# Patient Record
Sex: FEMALE | Race: BLACK OR AFRICAN AMERICAN | NOT HISPANIC OR LATINO | URBAN - METROPOLITAN AREA
[De-identification: names, ages, dates, MRNs, and addresses within clinical notes are randomized per-mention and may not be internally consistent; named-entity substitution may affect disease eponyms.]

---

## 2024-11-18 ENCOUNTER — EMERGENCY (EMERGENCY)
Facility: HOSPITAL | Age: 29
LOS: 1 days | Discharge: HOME | End: 2024-11-18
Attending: EMERGENCY MEDICINE | Admitting: EMERGENCY MEDICINE
Payer: COMMERCIAL

## 2024-11-18 VITALS
TEMPERATURE: 99 F | HEART RATE: 78 BPM | SYSTOLIC BLOOD PRESSURE: 106 MMHG | OXYGEN SATURATION: 100 % | RESPIRATION RATE: 18 BRPM | DIASTOLIC BLOOD PRESSURE: 63 MMHG

## 2024-11-18 VITALS
TEMPERATURE: 98 F | SYSTOLIC BLOOD PRESSURE: 126 MMHG | HEART RATE: 81 BPM | WEIGHT: 175.05 LBS | OXYGEN SATURATION: 99 % | DIASTOLIC BLOOD PRESSURE: 84 MMHG | RESPIRATION RATE: 18 BRPM

## 2024-11-18 LAB
ALBUMIN SERPL ELPH-MCNC: 3.8 G/DL — SIGNIFICANT CHANGE UP (ref 3.3–5)
ALP SERPL-CCNC: 51 U/L — SIGNIFICANT CHANGE UP (ref 40–120)
ALT FLD-CCNC: 17 U/L — SIGNIFICANT CHANGE UP (ref 4–33)
ANION GAP SERPL CALC-SCNC: 16 MMOL/L — HIGH (ref 7–14)
AST SERPL-CCNC: 21 U/L — SIGNIFICANT CHANGE UP (ref 4–32)
BASOPHILS # BLD AUTO: 0.03 K/UL — SIGNIFICANT CHANGE UP (ref 0–0.2)
BASOPHILS NFR BLD AUTO: 0.3 % — SIGNIFICANT CHANGE UP (ref 0–2)
BILIRUB SERPL-MCNC: 0.6 MG/DL — SIGNIFICANT CHANGE UP (ref 0.2–1.2)
BUN SERPL-MCNC: 8 MG/DL — SIGNIFICANT CHANGE UP (ref 7–23)
CALCIUM SERPL-MCNC: 9.8 MG/DL — SIGNIFICANT CHANGE UP (ref 8.4–10.5)
CHLORIDE SERPL-SCNC: 99 MMOL/L — SIGNIFICANT CHANGE UP (ref 98–107)
CO2 SERPL-SCNC: 21 MMOL/L — LOW (ref 22–31)
CREAT SERPL-MCNC: 0.59 MG/DL — SIGNIFICANT CHANGE UP (ref 0.5–1.3)
EGFR: 125 ML/MIN/1.73M2 — SIGNIFICANT CHANGE UP
EOSINOPHIL # BLD AUTO: 0.13 K/UL — SIGNIFICANT CHANGE UP (ref 0–0.5)
EOSINOPHIL NFR BLD AUTO: 1.4 % — SIGNIFICANT CHANGE UP (ref 0–6)
GLUCOSE SERPL-MCNC: 82 MG/DL — SIGNIFICANT CHANGE UP (ref 70–99)
HCT VFR BLD CALC: 32.3 % — LOW (ref 34.5–45)
HGB BLD-MCNC: 10.5 G/DL — LOW (ref 11.5–15.5)
IANC: 5.47 K/UL — SIGNIFICANT CHANGE UP (ref 1.8–7.4)
IMM GRANULOCYTES NFR BLD AUTO: 0.3 % — SIGNIFICANT CHANGE UP (ref 0–0.9)
LYMPHOCYTES # BLD AUTO: 2.43 K/UL — SIGNIFICANT CHANGE UP (ref 1–3.3)
LYMPHOCYTES # BLD AUTO: 26.9 % — SIGNIFICANT CHANGE UP (ref 13–44)
MAGNESIUM SERPL-MCNC: 1.5 MG/DL — LOW (ref 1.6–2.6)
MCHC RBC-ENTMCNC: 29 PG — SIGNIFICANT CHANGE UP (ref 27–34)
MCHC RBC-ENTMCNC: 32.5 G/DL — SIGNIFICANT CHANGE UP (ref 32–36)
MCV RBC AUTO: 89.2 FL — SIGNIFICANT CHANGE UP (ref 80–100)
MONOCYTES # BLD AUTO: 0.94 K/UL — HIGH (ref 0–0.9)
MONOCYTES NFR BLD AUTO: 10.4 % — SIGNIFICANT CHANGE UP (ref 2–14)
NEUTROPHILS # BLD AUTO: 5.47 K/UL — SIGNIFICANT CHANGE UP (ref 1.8–7.4)
NEUTROPHILS NFR BLD AUTO: 60.7 % — SIGNIFICANT CHANGE UP (ref 43–77)
NRBC # BLD: 0 /100 WBCS — SIGNIFICANT CHANGE UP (ref 0–0)
NRBC # FLD: 0 K/UL — SIGNIFICANT CHANGE UP (ref 0–0)
PLATELET # BLD AUTO: 212 K/UL — SIGNIFICANT CHANGE UP (ref 150–400)
POTASSIUM SERPL-MCNC: 3.5 MMOL/L — SIGNIFICANT CHANGE UP (ref 3.5–5.3)
POTASSIUM SERPL-SCNC: 3.5 MMOL/L — SIGNIFICANT CHANGE UP (ref 3.5–5.3)
PROT SERPL-MCNC: 6.8 G/DL — SIGNIFICANT CHANGE UP (ref 6–8.3)
RBC # BLD: 3.62 M/UL — LOW (ref 3.8–5.2)
RBC # FLD: 14.3 % — SIGNIFICANT CHANGE UP (ref 10.3–14.5)
SODIUM SERPL-SCNC: 136 MMOL/L — SIGNIFICANT CHANGE UP (ref 135–145)
WBC # BLD: 9.03 K/UL — SIGNIFICANT CHANGE UP (ref 3.8–10.5)
WBC # FLD AUTO: 9.03 K/UL — SIGNIFICANT CHANGE UP (ref 3.8–10.5)

## 2024-11-18 PROCEDURE — 99284 EMERGENCY DEPT VISIT MOD MDM: CPT | Mod: 25

## 2024-11-18 RX ORDER — SODIUM CHLORIDE 9 MG/ML
1000 INJECTION, SOLUTION INTRAMUSCULAR; INTRAVENOUS; SUBCUTANEOUS ONCE
Refills: 0 | Status: COMPLETED | OUTPATIENT
Start: 2024-11-18 | End: 2024-11-18

## 2024-11-18 RX ORDER — ONDANSETRON HYDROCHLORIDE 2 MG/ML
4 INJECTION, SOLUTION INTRAMUSCULAR; INTRAVENOUS ONCE
Refills: 0 | Status: COMPLETED | OUTPATIENT
Start: 2024-11-18 | End: 2024-11-18

## 2024-11-18 RX ADMIN — ONDANSETRON HYDROCHLORIDE 4 MILLIGRAM(S): 2 INJECTION, SOLUTION INTRAMUSCULAR; INTRAVENOUS at 04:23

## 2024-11-18 RX ADMIN — SODIUM CHLORIDE 1000 MILLILITER(S): 9 INJECTION, SOLUTION INTRAMUSCULAR; INTRAVENOUS; SUBCUTANEOUS at 04:23

## 2024-11-18 NOTE — ED ADULT TRIAGE NOTE - CHIEF COMPLAINT QUOTE
C/o hematemesis x 1 day. pt is ~14 weeks pregnant, . endorsing N/V with "blood clots in vomit." and lower abd pain. denies blood thinner use, dizziness. Hx asthma  as per L&D NP Prachi, pt to be evaluated in ED

## 2024-11-18 NOTE — ED PROVIDER NOTE - NSFOLLOWUPINSTRUCTIONS_ED_ALL_ED_FT
Your lab and imaging results are attached.    Please take the zofran as prescribed by your doctor. Please also follow up with your primary doctor.    Return to the ED for new or worsening symptoms.

## 2024-11-18 NOTE — ED ADULT NURSE NOTE - NSFALLUNIVINTERV_ED_ALL_ED
Bed/Stretcher in lowest position, wheels locked, appropriate side rails in place/Call bell, personal items and telephone in reach/Instruct patient to call for assistance before getting out of bed/chair/stretcher/Non-slip footwear applied when patient is off stretcher/[place] to call system/Physically safe environment - no spills, clutter or unnecessary equipment/Purposeful proactive rounding/Room/bathroom lighting operational, light cord in reach

## 2024-11-18 NOTE — ED ADULT NURSE REASSESSMENT NOTE - NS ED NURSE REASSESS COMMENT FT1
Patient A&Ox4, breathing with ease, no signs of acute distress, no complaints at this time, will continue to monitor and assess.

## 2024-11-18 NOTE — ED ADULT NURSE NOTE - OBJECTIVE STATEMENT
Patient states that she has been vomiting blood since yesterday morning and has  lower abdominal pain

## 2024-11-18 NOTE — ED PROVIDER NOTE - PATIENT PORTAL LINK FT
You can access the FollowMyHealth Patient Portal offered by [facility] by registering at the following website: [email or web address]. By joining [email or web address] portal, you will also be able to view your health information using other applications (apps) compatible with our system.

## 2024-11-18 NOTE — ED PROVIDER NOTE - CLINICAL SUMMARY MEDICAL DECISION MAKING FREE TEXT BOX
29-year-old female 14 weeks pregnant with confirmed IUP, presents due to spots of blood with blood clots noted in emesis today.  Patient is in multiple episodes of emesis that she says she has had since the beginning of her pregnancy.  Patient is not as concerned about the emesis and more concerned that this episode today had blood.  Denies fevers, lightheadedness, chest pain, shortness of breath, dumping, nausea, vomiting, vaginal bleeding or discharge.  Patient just prescribed Zofran by her obstetrics doctor but has not taken yet.  Afebrile, nontachycardic, not hypoxic, lungs clear, abdomen nontender.  .  Likely Shaniqua-Chung tear in setting of multiple episodes of emesis.  Will check for electrolyte derangement, fluids, pain control, reassess. 29-year-old female 14 weeks pregnant with confirmed IUP, presents due to streaks of blood with clots noted in emesis today.  Patient has had multiple episodes of emesis since the beginning of her pregnancy, but this is the first time she noticed blood.  Denies fevers, lightheadedness, chest pain, shortness of breath, abd pain, vaginal bleeding or discharge.  Patient recently prescribed Zofran by her OB doctor but has not started the medication yet. Afebrile, nontachycardic, not hypoxic, lungs clear, abdomen nontender.  .  Likely Shaniqua-Chung tear in setting of multiple episodes of emesis.  Will check for electrolyte derangement, fluids, pain control, reassess.

## 2024-11-18 NOTE — ED PROVIDER NOTE - ATTENDING CONTRIBUTION TO CARE
29F complaining of N/V and today had small amount of blood streaks with clots when vomiting.  Patient has had on and off vomiting during first trimester.  No previous ED visits.  Was prescribed medication 1 worsened at end of first trimester, Zofran.  Patient had not yet picked up Zofran but had additional vomiting today and saw blood so came to ED.  Denies abdominal pain, no vaginal bleeding or discharge, has OB follow-up and had uneventful last visit.  No urinary complaints.  Denies fever/chills.  No history of anemia, no lightheadedness/dizziness.  Denies CP/SOB.  MMM, clear lungs, heart reg, abd soft, NT to palp, no junito/guarding, no CVAT, no edema, NT calves.

## 2024-11-18 NOTE — ED ADULT NURSE NOTE - NS ED NURSE DISCH DISPOSITION
Detail Level: Detailed Render Post-Care Instructions In Note?: no Number Of Freeze-Thaw Cycles: 1 freeze-thaw cycle Duration Of Freeze Thaw-Cycle (Seconds): 3 Post-Care Instructions: I reviewed with the patient in detail post-care instructions. Patient is to wear sunprotection, and avoid picking at any of the treated lesions. Pt may apply Vaseline to crusted or scabbing areas. Show Aperture Variable?: Yes Consent: The patient's consent was obtained including but not limited to risks of crusting, scabbing, blistering, scarring, darker or lighter pigmentary change, recurrence, incomplete removal and infection. Discharged

## 2024-11-18 NOTE — ED PROVIDER NOTE - PHYSICAL EXAMINATION
GEN: NAD. A&Ox3. Non-toxic appearing.  HEENT: normocephalic, atraumatic, EOMI, PERRL, no scleral icterus, no conjuntival pallor, moist MM  CARDIAC: RRR, S1, S2, no murmur. Radial pulses present and symmetric b/l  PULM: CTA B/L no wheeze, rhonchi, rales.   ABD: soft NT, ND, no rebound no guarding  MSK: Moving all extremities, no edema  NEURO: no focal neurological deficits, CN 2-12 grossly intact  SKIN: warm, dry, no rash.

## 2025-03-15 ENCOUNTER — EMERGENCY (EMERGENCY)
Facility: HOSPITAL | Age: 30
LOS: 1 days | Discharge: STILL ADMITTED OR ADMINISTRATIVE | End: 2025-03-15
Admitting: EMERGENCY MEDICINE
Payer: COMMERCIAL

## 2025-03-15 ENCOUNTER — OUTPATIENT (OUTPATIENT)
Dept: INPATIENT UNIT | Facility: HOSPITAL | Age: 30
LOS: 1 days | Discharge: HOME | End: 2025-03-15
Payer: MEDICAID

## 2025-03-15 ENCOUNTER — ASOB RESULT (OUTPATIENT)
Age: 30
End: 2025-03-15

## 2025-03-15 ENCOUNTER — APPOINTMENT (OUTPATIENT)
Dept: ANTEPARTUM | Facility: CLINIC | Age: 30
End: 2025-03-15

## 2025-03-15 VITALS
TEMPERATURE: 98 F | RESPIRATION RATE: 16 BRPM | SYSTOLIC BLOOD PRESSURE: 128 MMHG | HEART RATE: 93 BPM | DIASTOLIC BLOOD PRESSURE: 82 MMHG

## 2025-03-15 VITALS
DIASTOLIC BLOOD PRESSURE: 86 MMHG | SYSTOLIC BLOOD PRESSURE: 126 MMHG | TEMPERATURE: 98 F | HEART RATE: 94 BPM | OXYGEN SATURATION: 99 % | RESPIRATION RATE: 18 BRPM

## 2025-03-15 VITALS — HEART RATE: 95 BPM | DIASTOLIC BLOOD PRESSURE: 78 MMHG | SYSTOLIC BLOOD PRESSURE: 126 MMHG

## 2025-03-15 DIAGNOSIS — O26.899 OTHER SPECIFIED PREGNANCY RELATED CONDITIONS, UNSPECIFIED TRIMESTER: ICD-10-CM

## 2025-03-15 DIAGNOSIS — Z87.81 PERSONAL HISTORY OF (HEALED) TRAUMATIC FRACTURE: Chronic | ICD-10-CM

## 2025-03-15 LAB
ALBUMIN SERPL ELPH-MCNC: 3.6 G/DL — SIGNIFICANT CHANGE UP (ref 3.3–5)
ALP SERPL-CCNC: 133 U/L — HIGH (ref 40–120)
ALT FLD-CCNC: 16 U/L — SIGNIFICANT CHANGE UP (ref 4–33)
ANION GAP SERPL CALC-SCNC: 12 MMOL/L — SIGNIFICANT CHANGE UP (ref 7–14)
APPEARANCE UR: ABNORMAL
AST SERPL-CCNC: 20 U/L — SIGNIFICANT CHANGE UP (ref 4–32)
BACTERIA # UR AUTO: NEGATIVE /HPF — SIGNIFICANT CHANGE UP
BILIRUB SERPL-MCNC: 0.6 MG/DL — SIGNIFICANT CHANGE UP (ref 0.2–1.2)
BILIRUB UR-MCNC: ABNORMAL
BUN SERPL-MCNC: 6 MG/DL — LOW (ref 7–23)
CALCIUM SERPL-MCNC: 9.3 MG/DL — SIGNIFICANT CHANGE UP (ref 8.4–10.5)
CAST: 3 /LPF — SIGNIFICANT CHANGE UP (ref 0–4)
CHLORIDE SERPL-SCNC: 101 MMOL/L — SIGNIFICANT CHANGE UP (ref 98–107)
CO2 SERPL-SCNC: 20 MMOL/L — LOW (ref 22–31)
COLOR SPEC: SIGNIFICANT CHANGE UP
CREAT ?TM UR-MCNC: 224 MG/DL — SIGNIFICANT CHANGE UP
CREAT SERPL-MCNC: 0.59 MG/DL — SIGNIFICANT CHANGE UP (ref 0.5–1.3)
DIFF PNL FLD: NEGATIVE — SIGNIFICANT CHANGE UP
EGFR: 125 ML/MIN/1.73M2 — SIGNIFICANT CHANGE UP
EGFR: 125 ML/MIN/1.73M2 — SIGNIFICANT CHANGE UP
GLUCOSE SERPL-MCNC: 66 MG/DL — LOW (ref 70–99)
GLUCOSE UR QL: NEGATIVE MG/DL — SIGNIFICANT CHANGE UP
HCT VFR BLD CALC: 31.1 % — LOW (ref 34.5–45)
HGB BLD-MCNC: 10 G/DL — LOW (ref 11.5–15.5)
KETONES UR-MCNC: NEGATIVE MG/DL — SIGNIFICANT CHANGE UP
LDH SERPL L TO P-CCNC: 242 U/L — HIGH (ref 135–225)
LEUKOCYTE ESTERASE UR-ACNC: ABNORMAL
MCHC RBC-ENTMCNC: 29.4 PG — SIGNIFICANT CHANGE UP (ref 27–34)
MCHC RBC-ENTMCNC: 32.2 G/DL — SIGNIFICANT CHANGE UP (ref 32–36)
MCV RBC AUTO: 91.5 FL — SIGNIFICANT CHANGE UP (ref 80–100)
NITRITE UR-MCNC: NEGATIVE — SIGNIFICANT CHANGE UP
NRBC # BLD AUTO: 0.06 K/UL — HIGH (ref 0–0)
NRBC # FLD: 0.06 K/UL — HIGH (ref 0–0)
NRBC BLD AUTO-RTO: 0 /100 WBCS — SIGNIFICANT CHANGE UP (ref 0–0)
PH UR: 7 — SIGNIFICANT CHANGE UP (ref 5–8)
PLATELET # BLD AUTO: 256 K/UL — SIGNIFICANT CHANGE UP (ref 150–400)
POTASSIUM SERPL-MCNC: 3.6 MMOL/L — SIGNIFICANT CHANGE UP (ref 3.5–5.3)
POTASSIUM SERPL-SCNC: 3.6 MMOL/L — SIGNIFICANT CHANGE UP (ref 3.5–5.3)
PROT ?TM UR-MCNC: 32 MG/DL — SIGNIFICANT CHANGE UP
PROT SERPL-MCNC: 7.3 G/DL — SIGNIFICANT CHANGE UP (ref 6–8.3)
PROT UR-MCNC: 30 MG/DL
PROT/CREAT UR-RTO: 0.1 RATIO — SIGNIFICANT CHANGE UP (ref 0–0.2)
RBC # BLD: 3.4 M/UL — LOW (ref 3.8–5.2)
RBC # FLD: 15.3 % — HIGH (ref 10.3–14.5)
RBC CASTS # UR COMP ASSIST: 4 /HPF — SIGNIFICANT CHANGE UP (ref 0–4)
REVIEW: SIGNIFICANT CHANGE UP
SODIUM SERPL-SCNC: 133 MMOL/L — LOW (ref 135–145)
SP GR SPEC: 1.02 — SIGNIFICANT CHANGE UP (ref 1–1.03)
SQUAMOUS # UR AUTO: 5 /HPF — SIGNIFICANT CHANGE UP (ref 0–5)
URATE SERPL-MCNC: 3.5 MG/DL — SIGNIFICANT CHANGE UP (ref 2.5–7)
UROBILINOGEN FLD QL: 2 MG/DL (ref 0.2–1)
WBC # BLD: 10.12 K/UL — SIGNIFICANT CHANGE UP (ref 3.8–10.5)
WBC # FLD AUTO: 10.12 K/UL — SIGNIFICANT CHANGE UP (ref 3.8–10.5)
WBC UR QL: 3 /HPF — SIGNIFICANT CHANGE UP (ref 0–5)

## 2025-03-15 PROCEDURE — 76815 OB US LIMITED FETUS(S): CPT | Mod: 26

## 2025-03-15 PROCEDURE — L9996: CPT

## 2025-03-15 PROCEDURE — 59025 FETAL NON-STRESS TEST: CPT | Mod: 26

## 2025-03-15 PROCEDURE — 99221 1ST HOSP IP/OBS SF/LOW 40: CPT | Mod: 25

## 2025-03-15 PROCEDURE — 76819 FETAL BIOPHYS PROFIL W/O NST: CPT | Mod: 26

## 2025-03-15 PROCEDURE — 93010 ELECTROCARDIOGRAM REPORT: CPT

## 2025-03-15 PROCEDURE — 93010 ELECTROCARDIOGRAM REPORT: CPT | Mod: 76

## 2025-03-15 NOTE — ED ADULT TRIAGE NOTE - CHIEF COMPLAINT QUOTE
C/o possible syncope episode x 1 hours ago. Reports lowering herself to floor. 31 weeks pregnant. . SAL may 31st. denies chest pain, lightheadedness, head strike/ LOC .hx: preeclampsia

## 2025-03-17 DIAGNOSIS — D21.9 BENIGN NEOPLASM OF CONNECTIVE AND OTHER SOFT TISSUE, UNSPECIFIED: ICD-10-CM

## 2025-03-17 DIAGNOSIS — O99.013 ANEMIA COMPLICATING PREGNANCY, THIRD TRIMESTER: ICD-10-CM

## 2025-03-17 DIAGNOSIS — O99.891 OTHER SPECIFIED DISEASES AND CONDITIONS COMPLICATING PREGNANCY: ICD-10-CM

## 2025-03-17 DIAGNOSIS — O16.3 UNSPECIFIED MATERNAL HYPERTENSION, THIRD TRIMESTER: ICD-10-CM

## 2025-03-17 DIAGNOSIS — O26.893 OTHER SPECIFIED PREGNANCY RELATED CONDITIONS, THIRD TRIMESTER: ICD-10-CM

## 2025-03-17 DIAGNOSIS — Z3A.31 31 WEEKS GESTATION OF PREGNANCY: ICD-10-CM

## 2025-03-17 DIAGNOSIS — O09.13 SUPERVISION OF PREGNANCY WITH HISTORY OF ECTOPIC PREGNANCY, THIRD TRIMESTER: ICD-10-CM

## 2025-03-17 DIAGNOSIS — O14.93 UNSPECIFIED PRE-ECLAMPSIA, THIRD TRIMESTER: ICD-10-CM

## 2025-03-17 DIAGNOSIS — D64.9 ANEMIA, UNSPECIFIED: ICD-10-CM

## 2025-03-17 DIAGNOSIS — R55 SYNCOPE AND COLLAPSE: ICD-10-CM

## 2025-04-12 ENCOUNTER — APPOINTMENT (OUTPATIENT)
Dept: ANTEPARTUM | Facility: CLINIC | Age: 30
End: 2025-04-12

## 2025-04-12 ENCOUNTER — EMERGENCY (EMERGENCY)
Facility: HOSPITAL | Age: 30
LOS: 1 days | End: 2025-04-12
Admitting: EMERGENCY MEDICINE
Payer: COMMERCIAL

## 2025-04-12 ENCOUNTER — OUTPATIENT (OUTPATIENT)
Dept: OUTPATIENT SERVICES | Facility: HOSPITAL | Age: 30
LOS: 1 days | End: 2025-04-12
Payer: COMMERCIAL

## 2025-04-12 VITALS — HEART RATE: 93 BPM | SYSTOLIC BLOOD PRESSURE: 132 MMHG | DIASTOLIC BLOOD PRESSURE: 78 MMHG

## 2025-04-12 VITALS
SYSTOLIC BLOOD PRESSURE: 133 MMHG | TEMPERATURE: 99 F | DIASTOLIC BLOOD PRESSURE: 86 MMHG | RESPIRATION RATE: 16 BRPM | HEART RATE: 98 BPM

## 2025-04-12 VITALS
TEMPERATURE: 98 F | SYSTOLIC BLOOD PRESSURE: 138 MMHG | HEART RATE: 99 BPM | HEIGHT: 66 IN | WEIGHT: 229.94 LBS | DIASTOLIC BLOOD PRESSURE: 85 MMHG | OXYGEN SATURATION: 100 % | RESPIRATION RATE: 19 BRPM

## 2025-04-12 DIAGNOSIS — Z87.81 PERSONAL HISTORY OF (HEALED) TRAUMATIC FRACTURE: Chronic | ICD-10-CM

## 2025-04-12 DIAGNOSIS — O26.899 OTHER SPECIFIED PREGNANCY RELATED CONDITIONS, UNSPECIFIED TRIMESTER: ICD-10-CM

## 2025-04-12 PROBLEM — Z00.00 ENCOUNTER FOR PREVENTIVE HEALTH EXAMINATION: Status: ACTIVE | Noted: 2025-04-12

## 2025-04-12 PROCEDURE — L9996: CPT

## 2025-04-12 PROCEDURE — 59025 FETAL NON-STRESS TEST: CPT | Mod: 26

## 2025-04-12 PROCEDURE — 99221 1ST HOSP IP/OBS SF/LOW 40: CPT | Mod: 25

## 2025-04-12 RX ORDER — ASPIRIN 325 MG
1 TABLET ORAL
Refills: 0 | DISCHARGE

## 2025-04-12 NOTE — ED ADULT TRIAGE NOTE - CHIEF COMPLAINT QUOTE
Pt arrives to ED c/o contractions all day.  No water broken.  , Pt seen at [facility] for OB.  SAL 25  Hx: Fibroids.  L&D accepted pt, transporting by ED Tech.

## 2025-04-15 DIAGNOSIS — O09.293 SUPERVISION OF PREGNANCY WITH OTHER POOR REPRODUCTIVE OR OBSTETRIC HISTORY, THIRD TRIMESTER: ICD-10-CM

## 2025-04-15 DIAGNOSIS — O47.03 FALSE LABOR BEFORE 37 COMPLETED WEEKS OF GESTATION, THIRD TRIMESTER: ICD-10-CM

## 2025-04-15 DIAGNOSIS — D21.9 BENIGN NEOPLASM OF CONNECTIVE AND OTHER SOFT TISSUE, UNSPECIFIED: ICD-10-CM

## 2025-04-15 DIAGNOSIS — O99.513 DISEASES OF THE RESPIRATORY SYSTEM COMPLICATING PREGNANCY, THIRD TRIMESTER: ICD-10-CM

## 2025-04-15 DIAGNOSIS — J45.909 UNSPECIFIED ASTHMA, UNCOMPLICATED: ICD-10-CM

## 2025-04-15 DIAGNOSIS — Z3A.35 35 WEEKS GESTATION OF PREGNANCY: ICD-10-CM

## 2025-04-15 DIAGNOSIS — O99.891 OTHER SPECIFIED DISEASES AND CONDITIONS COMPLICATING PREGNANCY: ICD-10-CM

## 2025-04-15 LAB
CULTURE RESULTS: SIGNIFICANT CHANGE UP
SPECIMEN SOURCE: SIGNIFICANT CHANGE UP

## 2025-05-10 ENCOUNTER — APPOINTMENT (OUTPATIENT)
Dept: ANTEPARTUM | Facility: CLINIC | Age: 30
End: 2025-05-10

## 2025-05-10 ENCOUNTER — EMERGENCY (EMERGENCY)
Facility: HOSPITAL | Age: 30
LOS: 1 days | End: 2025-05-10
Admitting: EMERGENCY MEDICINE
Payer: COMMERCIAL

## 2025-05-10 ENCOUNTER — INPATIENT (INPATIENT)
Facility: HOSPITAL | Age: 30
LOS: 1 days | Discharge: HOME | End: 2025-05-12
Attending: SPECIALIST | Admitting: SPECIALIST
Payer: COMMERCIAL

## 2025-05-10 VITALS
OXYGEN SATURATION: 96 % | RESPIRATION RATE: 16 BRPM | SYSTOLIC BLOOD PRESSURE: 140 MMHG | DIASTOLIC BLOOD PRESSURE: 89 MMHG | HEIGHT: 66 IN | TEMPERATURE: 98 F | HEART RATE: 91 BPM

## 2025-05-10 VITALS
SYSTOLIC BLOOD PRESSURE: 135 MMHG | RESPIRATION RATE: 18 BRPM | DIASTOLIC BLOOD PRESSURE: 91 MMHG | TEMPERATURE: 98 F | HEART RATE: 80 BPM

## 2025-05-10 DIAGNOSIS — O26.899 OTHER SPECIFIED PREGNANCY RELATED CONDITIONS, UNSPECIFIED TRIMESTER: ICD-10-CM

## 2025-05-10 DIAGNOSIS — Z36.89 ENCOUNTER FOR OTHER SPECIFIED ANTENATAL SCREENING: ICD-10-CM

## 2025-05-10 PROBLEM — J45.909 UNSPECIFIED ASTHMA, UNCOMPLICATED: Chronic | Status: ACTIVE | Noted: 2025-04-12

## 2025-05-10 LAB
ALBUMIN SERPL ELPH-MCNC: 3.5 G/DL — SIGNIFICANT CHANGE UP (ref 3.3–5)
ALP SERPL-CCNC: 212 U/L — HIGH (ref 40–120)
ALT FLD-CCNC: 15 U/L — SIGNIFICANT CHANGE UP (ref 4–33)
AMPHET UR-MCNC: NEGATIVE — SIGNIFICANT CHANGE UP
ANION GAP SERPL CALC-SCNC: 18 MMOL/L — HIGH (ref 7–14)
APPEARANCE UR: CLEAR — SIGNIFICANT CHANGE UP
AST SERPL-CCNC: 31 U/L — SIGNIFICANT CHANGE UP (ref 4–32)
BACTERIA # UR AUTO: NEGATIVE /HPF — SIGNIFICANT CHANGE UP
BARBITURATES UR SCN-MCNC: NEGATIVE — SIGNIFICANT CHANGE UP
BASOPHILS # BLD AUTO: 0.02 K/UL — SIGNIFICANT CHANGE UP (ref 0–0.2)
BASOPHILS NFR BLD AUTO: 0.3 % — SIGNIFICANT CHANGE UP (ref 0–2)
BENZODIAZ UR-MCNC: NEGATIVE — SIGNIFICANT CHANGE UP
BILIRUB SERPL-MCNC: 0.7 MG/DL — SIGNIFICANT CHANGE UP (ref 0.2–1.2)
BILIRUB UR-MCNC: NEGATIVE — SIGNIFICANT CHANGE UP
BLD GP AB SCN SERPL QL: NEGATIVE — SIGNIFICANT CHANGE UP
BUN SERPL-MCNC: 14 MG/DL — SIGNIFICANT CHANGE UP (ref 7–23)
CALCIUM SERPL-MCNC: 10.1 MG/DL — SIGNIFICANT CHANGE UP (ref 8.4–10.5)
CAST: 1 /LPF — SIGNIFICANT CHANGE UP (ref 0–4)
CHLORIDE SERPL-SCNC: 100 MMOL/L — SIGNIFICANT CHANGE UP (ref 98–107)
CO2 SERPL-SCNC: 16 MMOL/L — LOW (ref 22–31)
COCAINE METAB.OTHER UR-MCNC: NEGATIVE — SIGNIFICANT CHANGE UP
COLOR SPEC: YELLOW — SIGNIFICANT CHANGE UP
CREAT ?TM UR-MCNC: 182 MG/DL — SIGNIFICANT CHANGE UP
CREAT SERPL-MCNC: 0.71 MG/DL — SIGNIFICANT CHANGE UP (ref 0.5–1.3)
CREATININE URINE RESULT, DAU: 182 MG/DL — SIGNIFICANT CHANGE UP
DIFF PNL FLD: ABNORMAL
EGFR: 118 ML/MIN/1.73M2 — SIGNIFICANT CHANGE UP
EGFR: 118 ML/MIN/1.73M2 — SIGNIFICANT CHANGE UP
EOSINOPHIL # BLD AUTO: 0.07 K/UL — SIGNIFICANT CHANGE UP (ref 0–0.5)
EOSINOPHIL NFR BLD AUTO: 0.9 % — SIGNIFICANT CHANGE UP (ref 0–6)
FENTANYL UR QL SCN: NEGATIVE — SIGNIFICANT CHANGE UP
GLUCOSE SERPL-MCNC: 76 MG/DL — SIGNIFICANT CHANGE UP (ref 70–99)
GLUCOSE UR QL: NEGATIVE MG/DL — SIGNIFICANT CHANGE UP
HCT VFR BLD CALC: 35.5 % — SIGNIFICANT CHANGE UP (ref 34.5–45)
HGB BLD-MCNC: 11.5 G/DL — SIGNIFICANT CHANGE UP (ref 11.5–15.5)
HIV 1+2 AB+HIV1 P24 AG SERPL QL IA: SIGNIFICANT CHANGE UP
IANC: 4.75 K/UL — SIGNIFICANT CHANGE UP (ref 1.8–7.4)
IMM GRANULOCYTES NFR BLD AUTO: 0.6 % — SIGNIFICANT CHANGE UP (ref 0–0.9)
KETONES UR-MCNC: 15 MG/DL
LDH SERPL L TO P-CCNC: 310 U/L — HIGH (ref 135–225)
LEUKOCYTE ESTERASE UR-ACNC: NEGATIVE — SIGNIFICANT CHANGE UP
LYMPHOCYTES # BLD AUTO: 2.44 K/UL — SIGNIFICANT CHANGE UP (ref 1–3.3)
LYMPHOCYTES # BLD AUTO: 30.5 % — SIGNIFICANT CHANGE UP (ref 13–44)
MCHC RBC-ENTMCNC: 29.9 PG — SIGNIFICANT CHANGE UP (ref 27–34)
MCHC RBC-ENTMCNC: 32.4 G/DL — SIGNIFICANT CHANGE UP (ref 32–36)
MCV RBC AUTO: 92.4 FL — SIGNIFICANT CHANGE UP (ref 80–100)
METHADONE UR-MCNC: NEGATIVE — SIGNIFICANT CHANGE UP
MONOCYTES # BLD AUTO: 0.66 K/UL — SIGNIFICANT CHANGE UP (ref 0–0.9)
MONOCYTES NFR BLD AUTO: 8.3 % — SIGNIFICANT CHANGE UP (ref 2–14)
NEUTROPHILS # BLD AUTO: 4.75 K/UL — SIGNIFICANT CHANGE UP (ref 1.8–7.4)
NEUTROPHILS NFR BLD AUTO: 59.4 % — SIGNIFICANT CHANGE UP (ref 43–77)
NITRITE UR-MCNC: NEGATIVE — SIGNIFICANT CHANGE UP
NRBC # BLD AUTO: 0.03 K/UL — HIGH (ref 0–0)
NRBC # FLD: 0.03 K/UL — HIGH (ref 0–0)
NRBC BLD AUTO-RTO: 0 /100 WBCS — SIGNIFICANT CHANGE UP (ref 0–0)
OPIATES UR-MCNC: NEGATIVE — SIGNIFICANT CHANGE UP
OXYCODONE UR-MCNC: NEGATIVE — SIGNIFICANT CHANGE UP
PCP SPEC-MCNC: SIGNIFICANT CHANGE UP
PCP UR-MCNC: NEGATIVE — SIGNIFICANT CHANGE UP
PH UR: 6.5 — SIGNIFICANT CHANGE UP (ref 5–8)
PLATELET # BLD AUTO: 189 K/UL — SIGNIFICANT CHANGE UP (ref 150–400)
POTASSIUM SERPL-MCNC: 4.1 MMOL/L — SIGNIFICANT CHANGE UP (ref 3.5–5.3)
POTASSIUM SERPL-SCNC: 4.1 MMOL/L — SIGNIFICANT CHANGE UP (ref 3.5–5.3)
PROT ?TM UR-MCNC: 48 MG/DL — SIGNIFICANT CHANGE UP
PROT SERPL-MCNC: 7.3 G/DL — SIGNIFICANT CHANGE UP (ref 6–8.3)
PROT UR-MCNC: 30 MG/DL
PROT/CREAT UR-RTO: 0.3 RATIO — HIGH (ref 0–0.2)
RBC # BLD: 3.84 M/UL — SIGNIFICANT CHANGE UP (ref 3.8–5.2)
RBC # FLD: 16.7 % — HIGH (ref 10.3–14.5)
RBC CASTS # UR COMP ASSIST: 36 /HPF — HIGH (ref 0–4)
REVIEW: SIGNIFICANT CHANGE UP
RH IG SCN BLD-IMP: POSITIVE — SIGNIFICANT CHANGE UP
RH IG SCN BLD-IMP: POSITIVE — SIGNIFICANT CHANGE UP
RUBV IGG SER-ACNC: 1.59 INDEX — SIGNIFICANT CHANGE UP
RUBV IGG SER-IMP: POSITIVE — SIGNIFICANT CHANGE UP
SODIUM SERPL-SCNC: 134 MMOL/L — LOW (ref 135–145)
SP GR SPEC: 1.03 — SIGNIFICANT CHANGE UP (ref 1–1.03)
SQUAMOUS # UR AUTO: 2 /HPF — SIGNIFICANT CHANGE UP (ref 0–5)
T PALLIDUM AB TITR SER: NEGATIVE — SIGNIFICANT CHANGE UP
THC UR QL: NEGATIVE — SIGNIFICANT CHANGE UP
URATE SERPL-MCNC: 5.5 MG/DL — SIGNIFICANT CHANGE UP (ref 2.5–7)
UROBILINOGEN FLD QL: 1 MG/DL — SIGNIFICANT CHANGE UP (ref 0.2–1)
WBC # BLD: 7.99 K/UL — SIGNIFICANT CHANGE UP (ref 3.8–10.5)
WBC # FLD AUTO: 7.99 K/UL — SIGNIFICANT CHANGE UP (ref 3.8–10.5)
WBC UR QL: 2 /HPF — SIGNIFICANT CHANGE UP (ref 0–5)

## 2025-05-10 PROCEDURE — 76815 OB US LIMITED FETUS(S): CPT | Mod: 26

## 2025-05-10 PROCEDURE — 59409 OBSTETRICAL CARE: CPT | Mod: U9

## 2025-05-10 PROCEDURE — L9996: CPT

## 2025-05-10 RX ORDER — SODIUM CHLORIDE 9 G/1000ML
1000 INJECTION, SOLUTION INTRAVENOUS
Refills: 0 | Status: DISCONTINUED | OUTPATIENT
Start: 2025-05-10 | End: 2025-05-10

## 2025-05-10 RX ORDER — PRAMOXINE HCL 1 %
1 GEL (GRAM) TOPICAL EVERY 4 HOURS
Refills: 0 | Status: DISCONTINUED | OUTPATIENT
Start: 2025-05-10 | End: 2025-05-12

## 2025-05-10 RX ORDER — MODIFIED LANOLIN 100 %
1 CREAM (GRAM) TOPICAL EVERY 6 HOURS
Refills: 0 | Status: DISCONTINUED | OUTPATIENT
Start: 2025-05-10 | End: 2025-05-12

## 2025-05-10 RX ORDER — CLOSTRIDIUM TETANI TOXOID ANTIGEN (FORMALDEHYDE INACTIVATED), CORYNEBACTERIUM DIPHTHERIAE TOXOID ANTIGEN (FORMALDEHYDE INACTIVATED), BORDETELLA PERTUSSIS TOXOID ANTIGEN (GLUTARALDEHYDE INACTIVATED), BORDETELLA PERTUSSIS FILAMENTOUS HEMAGGLUTININ ANTIGEN (FORMALDEHYDE INACTIVATED), BORDETELLA PERTUSSIS PERTACTIN ANTIGEN, AND BORDETELLA PERTUSSIS FIMBRIAE 2/3 ANTIGEN 5; 2; 2.5; 5; 3; 5 [LF]/.5ML; [LF]/.5ML; UG/.5ML; UG/.5ML; UG/.5ML; UG/.5ML
0.5 INJECTION, SUSPENSION INTRAMUSCULAR ONCE
Refills: 0 | Status: DISCONTINUED | OUTPATIENT
Start: 2025-05-10 | End: 2025-05-12

## 2025-05-10 RX ORDER — OXYTOCIN-SODIUM CHLORIDE 0.9% IV SOLN 30 UNIT/500ML 30-0.9/5 UT/ML-%
SOLUTION INTRAVENOUS
Qty: 30 | Refills: 0 | Status: DISCONTINUED | OUTPATIENT
Start: 2025-05-10 | End: 2025-05-10

## 2025-05-10 RX ORDER — WITCH HAZEL LEAF
1 FLUID EXTRACT MISCELLANEOUS EVERY 4 HOURS
Refills: 0 | Status: DISCONTINUED | OUTPATIENT
Start: 2025-05-10 | End: 2025-05-12

## 2025-05-10 RX ORDER — HYDROCORTISONE 10 MG/G
1 CREAM TOPICAL EVERY 6 HOURS
Refills: 0 | Status: DISCONTINUED | OUTPATIENT
Start: 2025-05-10 | End: 2025-05-12

## 2025-05-10 RX ORDER — DIPHENHYDRAMINE HCL 12.5MG/5ML
25 ELIXIR ORAL EVERY 6 HOURS
Refills: 0 | Status: DISCONTINUED | OUTPATIENT
Start: 2025-05-10 | End: 2025-05-12

## 2025-05-10 RX ORDER — ACETAMINOPHEN 500 MG/5ML
975 LIQUID (ML) ORAL
Refills: 0 | Status: DISCONTINUED | OUTPATIENT
Start: 2025-05-10 | End: 2025-05-12

## 2025-05-10 RX ORDER — OXYTOCIN-SODIUM CHLORIDE 0.9% IV SOLN 30 UNIT/500ML 30-0.9/5 UT/ML-%
167 SOLUTION INTRAVENOUS
Qty: 30 | Refills: 0 | Status: DISCONTINUED | OUTPATIENT
Start: 2025-05-10 | End: 2025-05-11

## 2025-05-10 RX ORDER — OXYCODONE HYDROCHLORIDE 30 MG/1
5 TABLET ORAL ONCE
Refills: 0 | Status: DISCONTINUED | OUTPATIENT
Start: 2025-05-10 | End: 2025-05-12

## 2025-05-10 RX ORDER — SIMETHICONE 80 MG
80 TABLET,CHEWABLE ORAL EVERY 4 HOURS
Refills: 0 | Status: DISCONTINUED | OUTPATIENT
Start: 2025-05-10 | End: 2025-05-12

## 2025-05-10 RX ORDER — SODIUM CHLORIDE 9 G/1000ML
500 INJECTION, SOLUTION INTRAVENOUS ONCE
Refills: 0 | Status: COMPLETED | OUTPATIENT
Start: 2025-05-10 | End: 2025-05-10

## 2025-05-10 RX ORDER — IBUPROFEN 200 MG
600 TABLET ORAL EVERY 6 HOURS
Refills: 0 | Status: COMPLETED | OUTPATIENT
Start: 2025-05-10 | End: 2026-04-08

## 2025-05-10 RX ORDER — OXYCODONE HYDROCHLORIDE 30 MG/1
5 TABLET ORAL
Refills: 0 | Status: DISCONTINUED | OUTPATIENT
Start: 2025-05-10 | End: 2025-05-12

## 2025-05-10 RX ORDER — KETOROLAC TROMETHAMINE 30 MG/ML
30 INJECTION, SOLUTION INTRAMUSCULAR; INTRAVENOUS ONCE
Refills: 0 | Status: DISCONTINUED | OUTPATIENT
Start: 2025-05-10 | End: 2025-05-11

## 2025-05-10 RX ORDER — BENZOCAINE 220 MG/G
1 SPRAY, METERED PERIODONTAL EVERY 6 HOURS
Refills: 0 | Status: DISCONTINUED | OUTPATIENT
Start: 2025-05-10 | End: 2025-05-12

## 2025-05-10 RX ORDER — PRENATAL 136/IRON/FOLIC ACID 27 MG-1 MG
1 TABLET ORAL DAILY
Refills: 0 | Status: DISCONTINUED | OUTPATIENT
Start: 2025-05-10 | End: 2025-05-12

## 2025-05-10 RX ORDER — DIBUCAINE 10 MG/G
1 OINTMENT TOPICAL EVERY 6 HOURS
Refills: 0 | Status: DISCONTINUED | OUTPATIENT
Start: 2025-05-10 | End: 2025-05-12

## 2025-05-10 RX ORDER — MAGNESIUM HYDROXIDE 400 MG/5ML
30 SUSPENSION ORAL
Refills: 0 | Status: DISCONTINUED | OUTPATIENT
Start: 2025-05-10 | End: 2025-05-12

## 2025-05-10 RX ADMIN — Medication 3 MILLILITER(S): at 12:29

## 2025-05-10 RX ADMIN — SODIUM CHLORIDE 125 MILLILITER(S): 9 INJECTION, SOLUTION INTRAVENOUS at 11:45

## 2025-05-10 RX ADMIN — OXYTOCIN-SODIUM CHLORIDE 0.9% IV SOLN 30 UNIT/500ML 2 MILLIUNIT(S)/MIN: 30-0.9/5 SOLUTION at 16:25

## 2025-05-10 RX ADMIN — SODIUM CHLORIDE 1000 MILLILITER(S): 9 INJECTION, SOLUTION INTRAVENOUS at 12:44

## 2025-05-10 RX ADMIN — Medication 1 APPLICATION(S): at 11:15

## 2025-05-10 RX ADMIN — SODIUM CHLORIDE 1000 MILLILITER(S): 9 INJECTION, SOLUTION INTRAVENOUS at 16:30

## 2025-05-10 RX ADMIN — SODIUM CHLORIDE 1000 MILLILITER(S): 9 INJECTION, SOLUTION INTRAVENOUS at 18:01

## 2025-05-10 NOTE — OB RN DELIVERY SUMMARY - NS_SEPSISRSKCALC_OBGYN_ALL_OB_FT
GBS status in the 'Prenatal Lab tests/results section' on the OB RN Patient Profile must be documented.   EOS calculated successfully. EOS Risk Factor: 0.5/1000 live births ([facility] national incidence); GA=39w1d; Temp=98.4; ROM=13.583; GBS='Negative'; Antibiotics='No antibiotics or any antibiotics < 2 hrs prior to birth'

## 2025-05-10 NOTE — CHART NOTE - NSCHARTNOTEFT_GEN_A_CORE
PTA Note    PTA called for cat 2 FHT with minimal to moderate variability, intermittent late decelerations.   Patient presented in early labor and received augmentation with Pitocin. Currently on 4 milliunits of   pitocin and has been making adequate cervical change. Fetal heart rate responds to scalp stimulation and repositioning.   Plan to continue IV hydration, reposition to allow for fetal head descent and continue Pitocin augmentation.   Present for PTA, primary nurse, nurse in charge, Dr Abreu and Dr Valladares.   Adore Valladares MD

## 2025-05-10 NOTE — OB PROVIDER LABOR PROGRESS NOTE - NS_OBIHIFHRDETAILS_OBGYN_ALL_OB_FT
baseline 135 bpm, minimal to moderate variability, +accels, +intermittent late decels
135/mod/+accels, intermittent late decelerations
Baseline 140 Minimal variability with periods of moderate variability.   + Accels with scalp stimulation,  - Decels
130, moderate, +accels, no decels

## 2025-05-10 NOTE — OB PROVIDER H&P - NS_OBGYNHISTORY_OBGYN_ALL_OB_FT
OB HX:   SAB x's 1 complete / methotrexate   GYN HX:  fibroids x's 1  , states 1 myoma ~ 10.37 x 9.58x 6.5 right lateral subserosal

## 2025-05-10 NOTE — OB PROVIDER H&P - NSHPPHYSICALEXAM_GEN_ALL_CORE
GENERAL: pt in  NAD,   HEAD:  Atraumatic, normocephalic  EYES :PERRLA, conjunctiva and sclera clear  NECK: Supple, nontender   HEART: Regular rate and rhythm,   LUNGS: Unlabored respirations.  Clear to auscultation bilaterally   ABDOMEN: Soft, nontender, gravid     chaperone by : Keyanna Otto RN   VE 3.5/80/-2   + pooling , mod amount thin meconium fluid    scan  ABD   OB limited sono   TAS cephalic FH: 143 bpm MVP: 3.01   images saved to [facility]    EXTREMITIES: 2+ peripheral pulses bilaterally. No clubbing, cyanosis, or edema  NERVOUS SYSTEM:  A&Ox3, moving all extremities, no focal deficits   SKIN: No rashes or lesions  NST  Baseline  (       130   ) BPM  Variability (  x)  Moderate   (  ) Minimal  (  ) Absent  (  )  Marked  Accelerations (  x) 15x15   (  ) 10x10  (  ) no  Decelerations ( x ) no  (  ) Variable  (  ) Early  (  ) Late      Description _________  Contractions (  ) no  ( x ) yes     Description  ______every 2 minutes ____  Interpretation ( x ) reactive   (  )  non-reactive   vitals

## 2025-05-10 NOTE — OB RN DELIVERY SUMMARY - NSSELHIDDEN_OBGYN_ALL_OB_FT
[NS_DeliveryAttending1_OBGYN_ALL_OB_FT:[id number]],[NS_DeliveryAssist1_OBGYN_ALL_OB_FT:[id number]],[NS_DeliveryAssist2_OBGYN_ALL_OB_FT:[id number]=],[NS_DeliveryRN_OBGYN_ALL_OB_FT:[id number]]

## 2025-05-10 NOTE — OB PROVIDER DELIVERY SUMMARY - NSPROVIDERDELIVERYNOTE_OBGYN_ALL_OB_FT
Spontaneous vaginal delivery of liveborn male infant from RICH position. Head, shoulders, and body delivered easily. Infant passed to mother. Cord was clamped and cut and handed to awaiting pediatricians. Placenta delivered spontaneously, noted to be intact. Fundal massage was given and uterine fundus was found to be firm. Vaginal exam revealed intact cervix, vaginal walls. Bilateral sulcal tears noted and repaired with 2-0 vicryl suture. Perineum with second degree laceration, repaired in standard fashion with chromic suture. Excellent hemostasis was noted. Patient stable. Count correct x 2.    Dr. Copeland and BUDDY Rader, PGY4 present for delivery.     Quiana Calvo, PGY1

## 2025-05-10 NOTE — OB PROVIDER H&P - ASSESSMENT
30 y/o  @ 39.1 wks gestation with SROM @ 0745 light meconium and uterine contractions :  plan of care d/w dr Valladares / dr Rosado   admit to l&d  SROM & labor @ 39.1 wks gestation for epidural prn / PEC labs/ urine toxicology / expectant management   see admission orders      Risks, benefits, alternatives, and possible complications have been discussed in detail with the patient in her native language. Pre-admission, admission, and post admission procedures and expectations were discussed in detail. All questions answered, all appropriate hospital consents were signed. Anticipate normal vaginal delivery.   Informed consent was obtained. The following was discussed:   - Induction/augmentation of labor: use of medication and/or cook balloon to begin or enhance labor   - Obstetrical management including internal fetal/contraction monitoring   - Normal vaginal delivery   - Possible  section

## 2025-05-10 NOTE — OB PROVIDER LABOR PROGRESS NOTE - NS_SUBJECTIVE/OBJECTIVE_OBGYN_ALL_OB_FT
Pt seen for eval of the labor curve   Pt with strong epidural. Not feeling anything.
S:  Pt seen and examined for cervical change.    O:  Vital Signs Last 24 Hrs  T(C): 36.8 (10 May 2025 12:02), Max: 36.9 (10 May 2025 08:30)  T(F): 98.24 (10 May 2025 12:02), Max: 98.4 (10 May 2025 08:30)  HR: 94 (10 May 2025 12:29) (77 - 105)  BP: 125/67 (10 May 2025 11:26) (107/58 - 157/91)  BP(mean): --  RR: 18 (10 May 2025 12:02) (16 - 18)  SpO2: 100% (10 May 2025 12:20) (91% - 100%)
VE to evaluate progress in labor
R1 OB Labor Note    S: Patient seen and examined at bedside. Feeling more pain and intermittent rectal pressure    T(C): 36.8 (05-10-25 @ 16:26), Max: 36.9 (05-10-25 @ 08:30)  HR: 98 (05-10-25 @ 17:50) (74 - 108)  BP: 133/91 (05-10-25 @ 17:30) (105/53 - 157/91)  RR: 18 (05-10-25 @ 16:26) (16 - 18)  SpO2: 89% (05-10-25 @ 17:50) (86% - 100%)

## 2025-05-10 NOTE — OB PROVIDER H&P - HISTORY OF PRESENT ILLNESS
PNC with [facility]  28 y/o  @ 39.1 wks gestation presents with c/o SROM @ 0745 moderate amount light meconium fluid noted with painful uterine contractions every 5 minutes since 0745 states her pain is 10/10 on pain scale denies any VB reports +FM denies any headache visual disturbances or right upper epigastric pain denies any n/v/d denies any fever or chills ap care comp by :  fibroids x's 1  , states 1 myoma ~ 10.37 x 9.58x 6.5 right lateral subserosal   PNC @ [facility]

## 2025-05-10 NOTE — OB PROVIDER DELIVERY SUMMARY - NSSELHIDDEN_OBGYN_ALL_OB_FT
[NS_DeliveryAttending1_OBGYN_ALL_OB_FT:[id number]],[NS_DeliveryAssist1_OBGYN_ALL_OB_FT:[id number]],[NS_DeliveryAssist2_OBGYN_ALL_OB_FT:[id number]],[NS_DeliveryRN_OBGYN_ALL_OB_FT:[id number]]

## 2025-05-10 NOTE — ED ADULT TRIAGE NOTE - CHIEF COMPLAINT QUOTE
Pt is 39 wks pregnant, , SAL . C/o pelvic pressure x 40 minutes, water broke. Contractions about every 5 minutes. Pt appears anxious, instructed to take deep breaths. Spoke to Angeli in L+D

## 2025-05-10 NOTE — OB RN PATIENT PROFILE - NS_TUBALPLANNED_OBGYN_ALL_OB
ED PROVIDER NOTE   Date of Service: 12/1/2019   Time Seen: 4:34 AM   Provider: Sandra Hernandez MD     CHIEF COMPLAINT   Chief Complaint   Patient presents with   • Altered Mental Status        HISTORY OF PRESENT ILLNESS     Old records reviewed:  Patient is a history of hypertension, CVA, high cholesterol, depression, Parkinson's disease.  Patient was last seen in the emergency department for thrashing and agitation.    History obtained from EMS     History limited by: patient acute medical condition.      This patient is a 62 year old male presenting to the emergency department with a chief complaint of altered mental status and thrashing.  The care facility sent the patient over because he was uncontrollably thrashing and calling out at the facility.  EMS gave him 80 of ketamine in the ambulance with some reduction but he was still wildly throwing his arms and legs and had about when he arrived in the emergency department.  At that time his pupils were pinpoint consistent with the ketamine and he was talking about LSD and people poisoning him.  It was unclear initially if this was secondary to the ketamine or this altered mental status was secondary to paranoia or hallucinations associated with Parkinson's disease.  I was unable to take a history from the patient because of the extremis of his presentation.     PAST MEDICAL HISTORY     Past Medical History:   Diagnosis Date   • Cataract    • CVA (cerebral vascular accident) (CMS/[facility])    • Depression    • GERD (gastroesophageal reflux disease)    • Hyperlipidemia    • Hypertension    • Movement disorder       PAST FAMILY HISTORY   Family History   Problem Relation Age of Onset   • Hypertension Mother    • Stroke Mother    • Hypertension Brother    • Diabetes Brother    • Cancer Father       PAST SURGICAL HISTORY   Past Surgical History:   Procedure Laterality Date   • Hernia repair  06/09/2010   • Tonsillectomy and adenoidectomy        SOCIAL HISTORY   Social  History     Socioeconomic History   • Marital status: /Civil Union     Spouse name: Not on file   • Number of children: Not on file   • Years of education: Not on file   • Highest education level: Not on file   Occupational History   • Not on file   Social Needs   • Financial resource strain: Not on file   • Food insecurity:     Worry: Not on file     Inability: Not on file   • Transportation needs:     Medical: Not on file     Non-medical: Not on file   Tobacco Use   • Smoking status: Former Smoker     Packs/day: 1.00     Years: 42.00     Pack years: 42.00     Types: Cigarettes     Last attempt to quit: 2010     Years since quittin.9   • Smokeless tobacco: Never Used   Substance and Sexual Activity   • Alcohol use: No   • Drug use: No   • Sexual activity: Not on file   Lifestyle   • Physical activity:     Days per week: Not on file     Minutes per session: Not on file   • Stress: Not on file   Relationships   • Social connections:     Talks on phone: Not on file     Gets together: Not on file     Attends Islam service: Not on file     Active member of club or organization: Not on file     Attends meetings of clubs or organizations: Not on file     Relationship status: Not on file   • Intimate partner violence:     Fear of current or ex partner: Not on file     Emotionally abused: Not on file     Physically abused: Not on file     Forced sexual activity: Not on file   Other Topics Concern   • Not on file   Social History Narrative   • Not on file      Patient lives in [place]     MEDICATIONS        Summary of your Discharge Medications      You have not been prescribed any medications.          ALLERGIES   ALLERGIES:  No Known Allergies     REVIEW OF SYSTEMS   Review of Systems   Unable to perform ROS: Acuity of condition          PHYSICAL EXAM   Triage Vitals:   ED Triage Vitals   ED Triage Vitals Group      Temp 19 0402 98.7 °F (37.1 °C)      Pulse 19 0300 104      Resp 19  0340 22      BP 12/01/19 0300 (!) 159/94      SpO2 12/01/19 0300 (!) 86 %      EtCO2 mmHg --       Height 12/01/19 0340 6' 4\" (1.93 m)      Weight 12/01/19 0340 178 lb (80.7 kg)      Weight Scale Used --       BMI (Calculated) 12/01/19 0340 21.67      IBW/kg (Calculated) 12/01/19 0340 86.8          Constitutional: This patient is a well developed, and well nourished,62 year old male who is laying in bed swelling in his extremities, and legs and head around while calling out and yelling.  Eyes: Pupils are pinpoint bilaterally Extraocular movements are intact. Conjunctiva pink   ENT: Oropharynx is clear. There are moist mucous membranes.   Neck: Supple. No lymphadenopathy.Trachea midline   Respiratory: The exam is clear to auscultation bilaterally with normal effort. There are no wheezes, rales, or rhonchi.   Cardiovascular: The patient has a regular tachycardia There are no obvious murmurs, rubs, or gallops.2+ dorsal pedal pulses bilaterally.   Gastrointestinal: Soft, nontender, nondistended. There is no obvious hepatosplenomegaly. There is no rebound or guarding. The patient has normoactive bowel sounds.   Musculoskeletal: There is no clubbing, cyanosis or edema. The patient has a full range of motion in all extremities without pain.   Skin:  Profusely diaphoretic without obvious rashes   Neurologic:  Patient is alert, he is throwing himself around the bed, moving all 4 extremities equally, no obvious facial droop or asymmetry.  He does not follow commands but is awake, alert, and has fluent un- slurred speech.  Psychiatric:  Patient is talking about LSD, about people poisoning him, and he seems to be paranoid or hallucinating at this time.     DIAGNOSTIC INVESTIGATIONS   Radiology Studies:   Radiology reports reviewed.   XR Chest AP or PA   Final Result   Impression:      Findings suggestive of CHF with no large pleural effusions.         CT Head Brain   Final Result   IMPRESSION:      No acute intracranial  findings.              Laboratory Studies:   Labs were ordered and reviewed.   Labs Reviewed   CBC WITH DIFFERENTIAL - Abnormal; Notable for the following components:       Result Value    RBC 3.95 (*)     HGB 11.4 (*)     HCT 36.6 (*)     MCHC 31.1 (*)     Absolute Lymph 0.8 (*)     All other components within normal limits   COMPREHENSIVE METABOLIC PANEL - Abnormal; Notable for the following components:    Sodium 146 (*)     Chloride 112 (*)     BUN 23 (*)     BUN/Creatinine Ratio 32 (*)     TOTAL PROTEIN 5.8 (*)     Albumin 3.3 (*)     All other components within normal limits   ACETAMINOPHEN LEVEL - Abnormal; Notable for the following components:    ACETAMINOPHEN 3 (*)     All other components within normal limits   DRUG SCREEN, URINE - Abnormal; Notable for the following components:    U-Amphetamines POSITIVE (*)     All other components within normal limits   TROPONIN I ULTRA SENSITIVE - Abnormal; Notable for the following components:    TROPONIN I 0.06 (*)     All other components within normal limits   URINALYSIS WITH MICRO & CULTURE IF INDICATED - Abnormal; Notable for the following components:    KETONES TRACE (*)     SPECIFIC GRAVITY >1.030 (*)     BLOOD TRACE (*)     PROTEIN(URINE) 100 (*)     LEUKOCYTE ESTERASE MODERATE (*)     WBC >100 (*)     BACTERIA MODERATE (*)     All other components within normal limits   CREATINE KINASE - Abnormal; Notable for the following components:     (*)     All other components within normal limits   NT PROBNP - Abnormal; Notable for the following components:    NT proBNP 7,267 (*)     All other components within normal limits   TROPONIN I ULTRA SENSITIVE - Abnormal; Notable for the following components:    TROPONIN I 0.08 (*)     All other components within normal limits   ALCOHOL   LACTIC ACID, VENOUS   LIPASE   MAGNESIUM   PHOSPHORUS   PARTIAL THROMBOPLASTIN TIME   PROTHROMBIN TIME   SALICYLATE LEVEL   AMMONIA PLASMA   FREE T4   THYROID STIMULATING HORMONE    URINE CULTURE        Monitor and Pulse Ox:   Pulse oximetry is 95 % on room air, which is interpreted as within an acceptable range by me.     Patient was placed on cardiac monitor with sinus at a rate of 96 bpm, no ectopy, interpreted by me.      EKG obtained at 0400, interpreted by me at 0415: There is a normal sinus rhythm. There is a normal rate, 93 bpm. There are narrow complexes. There are normal intervals. There is no ST elevation or depression.  Diffuse T-wave flattening.  Compared with an EKG done May of 2018 the T-wave flattening is new.  Abnormal nondiagnostic  This EKG was interpreted by me, awaiting final interpretation by cardiology.     Repeat EKG obtained at 0516, interpreted by me at 0521: There is a normal sinus rhythm. There is a normal rate, 91. There are narrow complexes. There are normal intervals. There is no ST elevation or depression.  The nonspecific ST changes persist better unchanged from the prior EKG.  Abnormal nondiagnostic This EKG was interpreted by me, awaiting final interpretation by cardiology.          ASSESSMENT AND PLAN   Differential Diagnosis is broad and includes but is not limited to:  Ingestion, behavioral disturbance secondary to Parkinson's dementia, acute psychosis, electrolyte abnormality, acute organic issue such as ACS, renal failure, rhabdomyolysis.      Clinical Course and Critical Thinking: Akbar Navarrete is a 62 year old male who presents with extreme agitation.      Initial evaluation: Patient seen and evaluated by me. Vitals reviewed. Treatment and evaluation plan discussed with patient. Patient agrees with plan and has no concerns at this time.     Initial orders: Restraints Violent or Self-Destructive Adult (Age 18 and Older); Cardiac monitoring; Electrocardiogram 12-Lead; Insert peripheral IV; sodium chloride (NORMAL SALINE) 0.9 % bolus 1,000 mL; CBC with Automated Differential; COMPREHENSIVE METABOLIC PANEL; Straight cath; Alcohol; Acetaminophen Level;  No Ammonia; Drug Abuse Screen, Urine; LACTIC ACID, VENOUS; Lipase; Magnesium; Phosphorus; Partial Thromboplastin Time; Prothrombin Time; THYROID STIMULATING HORMONE; Free T4; Troponin I Ultra Sensitive; URINALYSIS WITH MICRO & CULTURE IF INDICATED; XR Chest AP or PA; CT Head Brain; ondansetron (ZOFRAN) injection 4 mg; diazePAM (VALIUM) injection 5 mg; Salicylate Level; Creatine Kinase; sodium chloride (NORMAL SALINE) 0.9 % bolus 1,000 mL    ED Course as of Dec 01 0608   Sun Dec 01, 2019   0233 Patient is flailing around on the bed, jumping up and down, yelling out, he knows the nurse was able to hold the pulse ox on is right handed hold his right arm with very little force that he was not attempting to move that but he was writing and jerking around with all other extremities Saint that he cannot help this.  There is no choreiform movement that I see I do not see anything that suggests an upper motor neuron issue.  I suspect there is a behavioral component to this and it may be from frontal low dysregulation secondary to the Parkinson's disease.      0234 Right now we are unable to approach this patient to get an IV to give him medications to assess him anyway as he has come close to head butting the      0234  nurse multiple times I am going to have him placed in restraints for danger to self and danger to staff until we can medically and chemically sedate him and evaluate him.      0304 After 5 mg of Valium the patient is calm and able to lay still in the bed, have a calm conversation.  I have removed the ankle restraints but wrist restraints will a be maintained at this time for protection of the IV and until it is clear that the patient will remain calm.  He already ripped 1 IV out for EMS.      0512 Spoke with Dr. Quintanilla of cardiology who suspects the troponin is related to the elevated CPK and the increased muscle use.  If the repeat troponin is the same or lower he is comfortable with this patient having an  outpatient cardiology follow-up.      0551 Patient's troponin did elevate slightly at 0.08.  I am unable to wean him off of the oxygen and his BNP came back elevated at 7000.  The patient will be given a dose of Lasix and transferred to [facility].      0552 Additionally the patient was found to have a UTI and antibiotics were ordered.      0605 Dr. Reeder of [facility] except the patient in transfer.        Cayla gomez states she will let Dr. Quintanilla of Cardiology note the patient will be transferred.        Interventions:   Medications   ondansetron (ZOFRAN) injection 4 mg (4 mg Intravenous Given 12/1/19 0402)   diazePAM (VALIUM) injection 5 mg (5 mg Intravenous Given 12/1/19 0417)   cefTRIAXone (ROCEPHIN) syringe 1,000 mg (has no administration in time range)   furosemide (LASIX) injection 40 mg (has no administration in time range)   sodium chloride (NORMAL SALINE) 0.9 % bolus 1,000 mL (0 mLs Intravenous Completed 12/1/19 0404)   sodium chloride (NORMAL SALINE) 0.9 % bolus 1,000 mL (0 mLs Intravenous Completed 12/1/19 0404)        Repeat Vitals:   Vitals:    12/01/19 0402 12/01/19 0420 12/01/19 0430 12/01/19 0500   BP:   (!) 150/94 150/87   Pulse:   90 91   Resp:   23 28   Temp: 98.7 °F (37.1 °C)      TempSrc: Oral      SpO2:  95% 95% 95%   Weight:       Height:            PROCEDURES   Critical Care  Performed by: Sandra Hernandez MD  Authorized by: Sandra Hernandez MD     Critical care provider statement:     Critical care time (minutes):  45    Critical care time was exclusive of:  Separately billable procedures and treating other patients and teaching time    Critical care was necessary to treat or prevent imminent or life-threatening deterioration of the following conditions:  Respiratory failure, CNS failure or compromise and cardiac failure    Critical care was time spent personally by me on the following activities:  Development of treatment plan with patient or surrogate, discussions  with consultants, evaluation of patient's response to treatment, examination of patient, obtaining history from patient or surrogate, ordering and performing treatments and interventions, ordering and review of laboratory studies, ordering and review of radiographic studies, pulse oximetry, re-evaluation of patient's condition and review of old charts           CLINICAL IMPRESSION   1. Elevated troponin I level    2. Acute respiratory failure with hypoxia (CMS/HCC)    3. Altered mental status, unspecified altered mental status type    4. Acute UTI    5. Dementia due to Parkinson's disease with behavioral disturbance (CMS/HCC)         DISPOSITION   Transfer to [facility]    MD Sandra Coyle MD  12/01/19 5284

## 2025-05-10 NOTE — OB NEONATOLOGY/PEDIATRICIAN DELIVERY SUMMARY - NSPEDSNEONOTESA_OBGYN_ALL_OB_FT
Baby is a 39.1 wk AGA male born to a 30 y/o  mother via . Peds called to delivery for category II tracing, meconium, and shoulder precaution. Maternal history significant for asthma. Pregnancy uncomplicated. Maternal blood type B+. PNL HIV pending, HepB pending, HepC pending, RPR pending, and Rubella pending. GBS negative on 4/15. SROM at 0745 on 5/10, light mec fluids. AROM of forebag at 1230 on 5/10. Delivery uncomplicated. Baby born vigorous and crying spontaneously. Warmed, dried, suctioned and stimulated. Apgars 8/9. Highest maternal temp 36.8. EOS 0.11. Mom plans to breastfeed and bottle feed and consents HepB vaccine. Circ requested.     BW: 3550g  : 5/10/25  TOB: 2120    Physical Exam:  Gen: NAD, +grimace  HEENT: anterior fontanel open soft and flat, no cleft lip/palate, ears normal set, no ear pits or tags. no lesions in mouth/throat, nares clinically patent  Resp: no increased work of breathing, good air entry b/l, clear to auscultation bilaterally  Cardio: Normal S1/S2, regular rate and rhythm, no murmurs, rubs or gallops  Abd: soft, non tender, non distended, + bowel sounds, umbilical cord with 3 vessels  Neuro: +grasp/suck/rowena, normal tone  Extremities: negative edmondson and ortolani, moving all extremities, full range of motion x 4, no crepitus  Skin: pink, warm. 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

## 2025-05-10 NOTE — OB PROVIDER LABOR PROGRESS NOTE - ASSESSMENT
- cervical exam unchanged  - discussed starting pitocin with patient, patient amenable. Will order.   - comfortable with epidural   - category I FHT    Karen Brady, PGY4  d/w Dr. Abreu
A/P:   - Labor:  in labor. SROM@745a, being augmented with pitocin since 430p. Head still high in the pelvis, plan for frequent repositioning to facilitate fetal descent. Giving another 500cc LR bolus.  - Fetus: cat 2  - GBS: negative  - Pain: epidural    L Irais, PGY2  d/w Dr. Correa
Pt is a 28yo  @39.1w admitted for early labor.    - Continue cont EFM, toco, IVF  - Epidural in place, patient comfortable  - S/p SROM@855a  - AROM of forebag performed at this E, light meconium noted  - GBS neg    D/w Dr. Abreu, service attending  Chani Canales MD PGY1
Pitocin increased to 6mu   Pt making cervical change, but still high in maternal pelvis   Maternal repositioning to facilitate fetal engagement and drop in station.   Reviewed plan of care with patient and partner     DW: Dr. Blaine Rader PGY-4

## 2025-05-10 NOTE — OB RN PATIENT PROFILE - THE IMPORTANCE OF THE CORRECT PLACEMENT OF THE INFANT AND THE PARENT’S ABILITY TO ASSESS THE INFANT'S SKIN COLOR WHILE PLACED ON SKIN TO SKIN HAS BEEN REINFORCED.
Received request from Khoa James (Dr. Nam Howell. [facility]) requesting Pathology report from [facility]. Sent to office. Statement Selected

## 2025-05-11 LAB
ALBUMIN SERPL ELPH-MCNC: 2.9 G/DL — LOW (ref 3.3–5)
ALP SERPL-CCNC: 164 U/L — HIGH (ref 40–120)
ALT FLD-CCNC: 16 U/L — SIGNIFICANT CHANGE UP (ref 4–33)
ANION GAP SERPL CALC-SCNC: 14 MMOL/L — SIGNIFICANT CHANGE UP (ref 7–14)
AST SERPL-CCNC: 27 U/L — SIGNIFICANT CHANGE UP (ref 4–32)
BASOPHILS # BLD AUTO: 0.02 K/UL — SIGNIFICANT CHANGE UP (ref 0–0.2)
BASOPHILS NFR BLD AUTO: 0.2 % — SIGNIFICANT CHANGE UP (ref 0–2)
BILIRUB SERPL-MCNC: 0.5 MG/DL — SIGNIFICANT CHANGE UP (ref 0.2–1.2)
BUN SERPL-MCNC: 18 MG/DL — SIGNIFICANT CHANGE UP (ref 7–23)
CALCIUM SERPL-MCNC: 9.5 MG/DL — SIGNIFICANT CHANGE UP (ref 8.4–10.5)
CHLORIDE SERPL-SCNC: 100 MMOL/L — SIGNIFICANT CHANGE UP (ref 98–107)
CO2 SERPL-SCNC: 19 MMOL/L — LOW (ref 22–31)
CREAT SERPL-MCNC: 0.99 MG/DL — SIGNIFICANT CHANGE UP (ref 0.5–1.3)
EGFR: 79 ML/MIN/1.73M2 — SIGNIFICANT CHANGE UP
EGFR: 79 ML/MIN/1.73M2 — SIGNIFICANT CHANGE UP
EOSINOPHIL # BLD AUTO: 0.09 K/UL — SIGNIFICANT CHANGE UP (ref 0–0.5)
EOSINOPHIL NFR BLD AUTO: 0.8 % — SIGNIFICANT CHANGE UP (ref 0–6)
GLUCOSE SERPL-MCNC: 123 MG/DL — HIGH (ref 70–99)
HBV SURFACE AG SERPL QL IA: SIGNIFICANT CHANGE UP
HCT VFR BLD CALC: 29.2 % — LOW (ref 34.5–45)
HCV AB S/CO SERPL IA: 0.14 S/CO — SIGNIFICANT CHANGE UP (ref 0–0.79)
HCV AB SERPL-IMP: SIGNIFICANT CHANGE UP
HGB BLD-MCNC: 9.6 G/DL — LOW (ref 11.5–15.5)
IANC: 8.47 K/UL — HIGH (ref 1.8–7.4)
IMM GRANULOCYTES NFR BLD AUTO: 0.7 % — SIGNIFICANT CHANGE UP (ref 0–0.9)
LDH SERPL L TO P-CCNC: 283 U/L — HIGH (ref 135–225)
LYMPHOCYTES # BLD AUTO: 17.7 % — SIGNIFICANT CHANGE UP (ref 13–44)
LYMPHOCYTES # BLD AUTO: 2.08 K/UL — SIGNIFICANT CHANGE UP (ref 1–3.3)
MCHC RBC-ENTMCNC: 30.1 PG — SIGNIFICANT CHANGE UP (ref 27–34)
MCHC RBC-ENTMCNC: 32.9 G/DL — SIGNIFICANT CHANGE UP (ref 32–36)
MCV RBC AUTO: 91.5 FL — SIGNIFICANT CHANGE UP (ref 80–100)
MONOCYTES # BLD AUTO: 1.04 K/UL — HIGH (ref 0–0.9)
MONOCYTES NFR BLD AUTO: 8.8 % — SIGNIFICANT CHANGE UP (ref 2–14)
NEUTROPHILS # BLD AUTO: 8.47 K/UL — HIGH (ref 1.8–7.4)
NEUTROPHILS NFR BLD AUTO: 71.8 % — SIGNIFICANT CHANGE UP (ref 43–77)
NRBC # BLD AUTO: 0.03 K/UL — HIGH (ref 0–0)
NRBC # FLD: 0.03 K/UL — HIGH (ref 0–0)
NRBC BLD AUTO-RTO: 0 /100 WBCS — SIGNIFICANT CHANGE UP (ref 0–0)
PLATELET # BLD AUTO: 155 K/UL — SIGNIFICANT CHANGE UP (ref 150–400)
POTASSIUM SERPL-MCNC: 3.9 MMOL/L — SIGNIFICANT CHANGE UP (ref 3.5–5.3)
POTASSIUM SERPL-SCNC: 3.9 MMOL/L — SIGNIFICANT CHANGE UP (ref 3.5–5.3)
PROT SERPL-MCNC: 5.9 G/DL — LOW (ref 6–8.3)
RBC # BLD: 3.19 M/UL — LOW (ref 3.8–5.2)
RBC # FLD: 16.3 % — HIGH (ref 10.3–14.5)
SODIUM SERPL-SCNC: 133 MMOL/L — LOW (ref 135–145)
URATE SERPL-MCNC: 6.4 MG/DL — SIGNIFICANT CHANGE UP (ref 2.5–7)
WBC # BLD: 11.78 K/UL — HIGH (ref 3.8–10.5)
WBC # FLD AUTO: 11.78 K/UL — HIGH (ref 3.8–10.5)

## 2025-05-11 RX ORDER — ALBUTEROL SULFATE 2.5 MG/3ML
2 VIAL, NEBULIZER (ML) INHALATION EVERY 6 HOURS
Refills: 0 | Status: DISCONTINUED | OUTPATIENT
Start: 2025-05-11 | End: 2025-05-12

## 2025-05-11 RX ORDER — PRENATAL 136/IRON/FOLIC ACID 27 MG-1 MG
1 TABLET ORAL
Refills: 0 | DISCHARGE

## 2025-05-11 RX ORDER — ACETAMINOPHEN 500 MG/5ML
3 LIQUID (ML) ORAL
Qty: 0 | Refills: 0 | DISCHARGE
Start: 2025-05-11

## 2025-05-11 RX ORDER — FERROUS SULFATE 137(45) MG
1 TABLET, EXTENDED RELEASE ORAL
Refills: 0 | DISCHARGE

## 2025-05-11 RX ORDER — IBUPROFEN 200 MG
600 TABLET ORAL EVERY 6 HOURS
Refills: 0 | Status: DISCONTINUED | OUTPATIENT
Start: 2025-05-11 | End: 2025-05-12

## 2025-05-11 RX ORDER — SENNA 187 MG
2 TABLET ORAL AT BEDTIME
Refills: 0 | Status: DISCONTINUED | OUTPATIENT
Start: 2025-05-11 | End: 2025-05-12

## 2025-05-11 RX ORDER — FERROUS SULFATE 137(45) MG
325 TABLET, EXTENDED RELEASE ORAL DAILY
Refills: 0 | Status: DISCONTINUED | OUTPATIENT
Start: 2025-05-11 | End: 2025-05-12

## 2025-05-11 RX ORDER — ALBUTEROL SULFATE 2.5 MG/3ML
2 VIAL, NEBULIZER (ML) INHALATION
Refills: 0 | DISCHARGE

## 2025-05-11 RX ORDER — MODIFIED LANOLIN 100 %
1 CREAM (GRAM) TOPICAL
Qty: 0 | Refills: 0 | DISCHARGE
Start: 2025-05-11

## 2025-05-11 RX ORDER — ASPIRIN 325 MG
2 TABLET ORAL
Refills: 0 | DISCHARGE

## 2025-05-11 RX ORDER — IBUPROFEN 200 MG
1 TABLET ORAL
Qty: 0 | Refills: 0 | DISCHARGE
Start: 2025-05-11

## 2025-05-11 RX ORDER — WITCH HAZEL LEAF
1 FLUID EXTRACT MISCELLANEOUS
Qty: 0 | Refills: 0 | DISCHARGE
Start: 2025-05-11

## 2025-05-11 RX ADMIN — Medication 975 MILLIGRAM(S): at 09:45

## 2025-05-11 RX ADMIN — Medication 600 MILLIGRAM(S): at 06:36

## 2025-05-11 RX ADMIN — Medication 3 MILLILITER(S): at 06:06

## 2025-05-11 RX ADMIN — Medication 2 TABLET(S): at 20:56

## 2025-05-11 RX ADMIN — Medication 500 MILLIGRAM(S): at 12:57

## 2025-05-11 RX ADMIN — KETOROLAC TROMETHAMINE 30 MILLIGRAM(S): 30 INJECTION, SOLUTION INTRAMUSCULAR; INTRAVENOUS at 00:10

## 2025-05-11 RX ADMIN — Medication 600 MILLIGRAM(S): at 13:57

## 2025-05-11 RX ADMIN — Medication 975 MILLIGRAM(S): at 21:27

## 2025-05-11 RX ADMIN — Medication 1 TABLET(S): at 12:57

## 2025-05-11 RX ADMIN — Medication 325 MILLIGRAM(S): at 12:57

## 2025-05-11 RX ADMIN — Medication 3 MILLILITER(S): at 21:27

## 2025-05-11 RX ADMIN — Medication 975 MILLIGRAM(S): at 08:54

## 2025-05-11 RX ADMIN — Medication 3 MILLILITER(S): at 17:09

## 2025-05-11 RX ADMIN — Medication 600 MILLIGRAM(S): at 17:58

## 2025-05-11 RX ADMIN — Medication 600 MILLIGRAM(S): at 18:51

## 2025-05-11 RX ADMIN — Medication 600 MILLIGRAM(S): at 06:06

## 2025-05-11 RX ADMIN — Medication 975 MILLIGRAM(S): at 20:57

## 2025-05-11 RX ADMIN — Medication 600 MILLIGRAM(S): at 12:57

## 2025-05-11 NOTE — PROGRESS NOTE ADULT - ASSESSMENT
A/P: 28yo PPD#1 s/p  c/b gHTN. . Patient is stable and doing well post-partum. VSS, AF.    #gHTN  - BP's overnight: 120-150s/60-80s  - See separate documentation for gHTN counseling  - HELLP labs and P/C not performed postpartum  - continue to monitor for S/s sPEC  - Patient reports she has blood pressure cuff at home  - Advised patient to return to OB clinic this week for BP check    #Routine postpartum care  - Pain well controlled, continue current pain regimen  - Increase ambulation, SCDs when not ambulating  - Continue regular diet  - Hct: 35.5->29.2  - Continue to monitor vaginal lochia  - Postpartum contraception: declines    Chani Canales PGY1 A/P: 30yo PPD#1 s/p  c/b gHTN. . Patient is stable and doing well post-partum. VSS, AF.    #gHTN  - BP's overnight: 120-150s/60-80s  - See separate documentation for gHTN counseling  - HELLP labs and P/C not performed postpartum - will obtain CMP, uric acid, LDH this AM  - continue to monitor for S/s sPEC  - Patient reports she has blood pressure cuff at home  - Advised patient to return to OB clinic this week for BP check    #Routine postpartum care  - Pain well controlled, continue current pain regimen  - Increase ambulation, SCDs when not ambulating  - Continue regular diet  - Hct: 35.5->29.2  - Continue to monitor vaginal lochia  - Postpartum contraception: declines    Chani Canales PGY1

## 2025-05-11 NOTE — DISCHARGE NOTE OB - ADDITIONAL SUPPORT AND RESOURCES MAY BE AVAILABLE THROUGH YOUR BIRTHING FACILITY
Statement Selected [de-identified] : Ms. Harrington was diagnosed with breast cancer at age 46 in December 2021.\par \par She had a screening mammogram on 11/3/21 which showed a questionable new asymmetry  upper outer posterior right breast. \par Subsequent diagnostic mammogram/sonogram on 12/3/21 showed: 1.2 x 1.2 x 1.7 cm irregular mass at 10:00 right breast, 6 cmfn. No axillary adenopathy.\par \par On 12/9/21 right breast 10:00, 6 cmfn core biposy - IDC, grade 6/9, 1 cm in length. ER 90%, IL>90%, HER2 negative. \par \par On 12/20/21 MRI breast -Marked background parenchymal enhancement significantly lowers the sensitivity of breast MRI for detection of small enhancing lesions.\par A 2.1 cm biopsy-proven malignancy in the posterior 10:00 RIGHT breast. Additionally, there is suspicious asymmetric nonmass enhancement involving most of the upper right breast including the site of biopsy-proven malignancy in the upper outer breast and the upper inner quadrant. MR guided biopsy of a representative region in the upper inner quadrant is recommended ([id number]).\par No lymphadenopathy and no MR evidence of malignancy in the contralateral breast.\par \par PMHx: Denies \par FMHx: Maternal Great Aunt breast Cancer, Father prostate cancer dx early 60's \par Sx: Cystectomy  \par Socx: Social Alcohol Use, Non-smoker \par PCP: Dr. Yung Beverly in [place] \par \par Planning on b/l mastectomy\par LMS 12/26/2021\par GYN Dr. Bass, pap smear - 04/2021 \par Notes she has 3 children.     [de-identified] : Patient returns for follow-up \par S/p Bilateral mastectomy with RICH reconstruction  on 2/09/22. No complications. \par Abdominal tightness. Notes improving.\par LMP 3/19/22\par \par

## 2025-05-11 NOTE — CHART NOTE - NSCHARTNOTEFT_GEN_A_CORE
At the bedside to discuss diagnosis of gHTN on review of blood pressures overnight.  Patient had multiple mild range BPs >4 hours apart.    Patient denies HA, SOB, changes or spots in vision, new swelling in the hands and face, or RUQ/epigastric pain.   HELLP labs and P/C not sent postpartum.    Informed patient that given meeting criteria, she is at increased risk of developing hypertensive disorders and/or PEC in future pregnancies. In addition, she was informed that she is at increased risk of developing hypertension and cardiovascular disease outside of pregnancy. In addition, discussed the following with the patient: how to take BP at home, what BP values are concerning (and what are the appropriate next steps given particular values), and the need for follow-up 48-72hrs after discharge.     Discussed close BP monitoring and potential need for antihypertensive medications if BPs continue to be elevated.   Discussed sxs of PEC and if later meeting criteria and with severe features, may need to be treated with Magnesium sulfate for seizure prophylaxis.    Chani Canales, PGY-1

## 2025-05-11 NOTE — DISCHARGE NOTE OB - PROVIDER TOKENS
FREE:[LAST:[[facility]],FIRST:[Oncology Building, Basement],PHONE:[phone]],FAX:[(   )    -],ADDRESS:[[address] [place], [place] [address]],FOLLOWUP:[1-3 days],ESTABLISHEDPATIENT:[T]]

## 2025-05-11 NOTE — DISCHARGE NOTE OB - NS MD DC FALL RISK RISK
For information on Fall & Injury Prevention, visit: [email or web address] OR  [email or web address] OR  https://www.cdc.gov/steadi/patient.html

## 2025-05-11 NOTE — DISCHARGE NOTE OB - MATERIALS PROVIDED
Vaccinations/[facility]  Screening Program/Breastfeeding Log/Bottle Feeding Log/Guide to Postpartum Care/[facility] Hearing Screen Program/Back To Sleep Handout/Shaken Baby Prevention Handout/Tdap Vaccination (VIS Pub Date: 2012)

## 2025-05-11 NOTE — DISCHARGE NOTE OB - HOSPITAL COURSE
Patient had an uncomplicated  followed by a postpartum course c/b gHTN. , H/H: 11.5/35.5->9.6/29.2. On postpartum day 2, patient was discharged home in stable condition, voiding spontaneously and with normal vital signs.

## 2025-05-11 NOTE — DISCHARGE NOTE OB - CARE PLAN
1 Principal Discharge DX:	 (normal spontaneous vaginal delivery)  Assessment and plan of treatment:	After discharge, please stay on pelvic rest for 6 weeks, meaning no sexual intercourse, no tampons and no douching.  No driving for 2 weeks as women can loose a lot of blood during delivery and there is a possibility of being lightheaded/fainting.  No lifting objects heavier than baby for two weeks.  Expect to have vaginal bleeding/spotting for up to six weeks.  The bleeding should get lighter and more white/light brown with time.  For bleeding soaking more than a pad an hour or passing clots greater than the size of your fist, come in to the emergency department.    Follow up in clinic in 6 weeks.  Secondary Diagnosis:	Gestational hypertension  Assessment and plan of treatment:	Due to diagnosis of gestational hypertension, please return for OBGYN visit for blood pressure check within 48-72 hours of discharge. At home, please measure blood pressures three times a day. Call OBGYN if pressures are above 140/90 and/or when exhibiting signs or symptoms of pre-eclampsia such as headache, vision changes, difficulty breathing, right quadrant abdominal pain or any concerns.

## 2025-05-11 NOTE — DISCHARGE NOTE OB - CARE PROVIDER_API CALL
[facility], Oncology Building, Basement  [address] [place] [address]  Phone: [phone]  Fax: (   )    -  Established Patient  Follow Up Time: 1-3 days

## 2025-05-11 NOTE — DISCHARGE NOTE OB - PLAN OF CARE
After discharge, please stay on pelvic rest for 6 weeks, meaning no sexual intercourse, no tampons and no douching.  No driving for 2 weeks as women can loose a lot of blood during delivery and there is a possibility of being lightheaded/fainting.  No lifting objects heavier than baby for two weeks.  Expect to have vaginal bleeding/spotting for up to six weeks.  The bleeding should get lighter and more white/light brown with time.  For bleeding soaking more than a pad an hour or passing clots greater than the size of your fist, come in to the emergency department.    Follow up in clinic in 6 weeks. Due to diagnosis of gestational hypertension, please return for OBGYN visit for blood pressure check within 48-72 hours of discharge. At home, please measure blood pressures three times a day. Call OBGYN if pressures are above 140/90 and/or when exhibiting signs or symptoms of pre-eclampsia such as headache, vision changes, difficulty breathing, right quadrant abdominal pain or any concerns.

## 2025-05-11 NOTE — DISCHARGE NOTE OB - MEDICATION SUMMARY - MEDICATIONS TO TAKE
I will START or STAY ON the medications listed below when I get home from the hospital:    ibuprofen 600 mg oral tablet  -- 1 tab(s) by mouth every 6 hours  -- Indication: For Pain    acetaminophen 325 mg oral tablet  -- 3 tab(s) by mouth every 6 hours  -- Indication: For Pain    lanolin topical ointment  -- 1 Apply on skin to affected area every 6 hours As needed nipple soreness  -- Indication: For Nipple pain    witch hazel 50% topical pad  -- 1 Apply on skin to affected area every 4 hours As needed Perineal discomfort  -- Indication: For Perineal pain   I will START or STAY ON the medications listed below when I get home from the hospital:    blood pressure cuff  -- If you had high blood pressure during your pregnancy, during labor, or after delivery, please follow up with your OB/Gyn in 2 days for a BP check.    Continue to monitor your blood pressures three times daily at home (before breakfast, lunch, and dinner).  If any blood pressures are GREATER than 160/110, or if you experience changes in your vision, headache not improved with tylenol, persistent nausea, vomiting and/or right upper abdominal pain, present to ED for further evaluation..  -- Indication: For BP monitoring    ibuprofen 600 mg oral tablet  -- 1 tab(s) by mouth every 6 hours  -- Indication: For pain    acetaminophen 325 mg oral tablet  -- 3 tab(s) by mouth every 6 hours  -- Indication: For pain    lanolin topical ointment  -- 1 Apply on skin to affected area every 6 hours As needed nipple soreness  -- Indication: For postpartum    witch hazel 50% topical pad  -- 1 Apply on skin to affected area every 4 hours As needed Perineal discomfort  -- Indication: For postpartum

## 2025-05-11 NOTE — DISCHARGE NOTE OB - PATIENT PORTAL LINK FT
You can access the FollowMyHealth Patient Portal offered by [facility] by registering at the following website: [email or web address]. By joining [employer]’s FollowMyHealth portal, you will also be able to view your health information using other applications (apps) compatible with our system.

## 2025-05-11 NOTE — DISCHARGE NOTE OB - FINANCIAL ASSISTANCE
[facility] provides services at a reduced cost to those who are determined to be eligible through [facility]’s financial assistance program. Information regarding [facility]’s financial assistance program can be found by going to [email or web address] or by calling [phone].

## 2025-05-12 ENCOUNTER — TRANSCRIPTION ENCOUNTER (OUTPATIENT)
Age: 30
End: 2025-05-12

## 2025-05-12 VITALS
HEART RATE: 95 BPM | OXYGEN SATURATION: 100 % | SYSTOLIC BLOOD PRESSURE: 132 MMHG | TEMPERATURE: 98 F | DIASTOLIC BLOOD PRESSURE: 84 MMHG | RESPIRATION RATE: 18 BRPM

## 2025-05-12 LAB
ALBUMIN SERPL ELPH-MCNC: 3.3 G/DL — SIGNIFICANT CHANGE UP (ref 3.3–5)
ALP SERPL-CCNC: 173 U/L — HIGH (ref 40–120)
ALT FLD-CCNC: 18 U/L — SIGNIFICANT CHANGE UP (ref 4–33)
ANION GAP SERPL CALC-SCNC: 13 MMOL/L — SIGNIFICANT CHANGE UP (ref 7–14)
AST SERPL-CCNC: 26 U/L — SIGNIFICANT CHANGE UP (ref 4–32)
BASOPHILS # BLD AUTO: 0.03 K/UL — SIGNIFICANT CHANGE UP (ref 0–0.2)
BASOPHILS # BLD AUTO: 0.04 K/UL — SIGNIFICANT CHANGE UP (ref 0–0.2)
BASOPHILS NFR BLD AUTO: 0.2 % — SIGNIFICANT CHANGE UP (ref 0–2)
BASOPHILS NFR BLD AUTO: 0.3 % — SIGNIFICANT CHANGE UP (ref 0–2)
BILIRUB SERPL-MCNC: 0.3 MG/DL — SIGNIFICANT CHANGE UP (ref 0.2–1.2)
BUN SERPL-MCNC: 12 MG/DL — SIGNIFICANT CHANGE UP (ref 7–23)
CALCIUM SERPL-MCNC: 10.1 MG/DL — SIGNIFICANT CHANGE UP (ref 8.4–10.5)
CHLORIDE SERPL-SCNC: 106 MMOL/L — SIGNIFICANT CHANGE UP (ref 98–107)
CO2 SERPL-SCNC: 19 MMOL/L — LOW (ref 22–31)
CREAT SERPL-MCNC: 0.75 MG/DL — SIGNIFICANT CHANGE UP (ref 0.5–1.3)
EGFR: 110 ML/MIN/1.73M2 — SIGNIFICANT CHANGE UP
EGFR: 110 ML/MIN/1.73M2 — SIGNIFICANT CHANGE UP
EOSINOPHIL # BLD AUTO: 0.18 K/UL — SIGNIFICANT CHANGE UP (ref 0–0.5)
EOSINOPHIL # BLD AUTO: 0.18 K/UL — SIGNIFICANT CHANGE UP (ref 0–0.5)
EOSINOPHIL NFR BLD AUTO: 1.3 % — SIGNIFICANT CHANGE UP (ref 0–6)
EOSINOPHIL NFR BLD AUTO: 1.4 % — SIGNIFICANT CHANGE UP (ref 0–6)
GLUCOSE SERPL-MCNC: 150 MG/DL — HIGH (ref 70–99)
HCT VFR BLD CALC: 24.6 % — LOW (ref 34.5–45)
HCT VFR BLD CALC: 24.9 % — LOW (ref 34.5–45)
HCT VFR BLD CALC: 26.9 % — LOW (ref 34.5–45)
HGB BLD-MCNC: 7.9 G/DL — LOW (ref 11.5–15.5)
HGB BLD-MCNC: 8 G/DL — LOW (ref 11.5–15.5)
HGB BLD-MCNC: 9 G/DL — LOW (ref 11.5–15.5)
IANC: 8.42 K/UL — HIGH (ref 1.8–7.4)
IANC: 8.63 K/UL — HIGH (ref 1.8–7.4)
IMM GRANULOCYTES NFR BLD AUTO: 0.9 % — SIGNIFICANT CHANGE UP (ref 0–0.9)
IMM GRANULOCYTES NFR BLD AUTO: 1.4 % — HIGH (ref 0–0.9)
LDH SERPL L TO P-CCNC: 321 U/L — HIGH (ref 135–225)
LYMPHOCYTES # BLD AUTO: 23.6 % — SIGNIFICANT CHANGE UP (ref 13–44)
LYMPHOCYTES # BLD AUTO: 25.3 % — SIGNIFICANT CHANGE UP (ref 13–44)
LYMPHOCYTES # BLD AUTO: 3.08 K/UL — SIGNIFICANT CHANGE UP (ref 1–3.3)
LYMPHOCYTES # BLD AUTO: 3.5 K/UL — HIGH (ref 1–3.3)
MCHC RBC-ENTMCNC: 30 PG — SIGNIFICANT CHANGE UP (ref 27–34)
MCHC RBC-ENTMCNC: 30.4 PG — SIGNIFICANT CHANGE UP (ref 27–34)
MCHC RBC-ENTMCNC: 31.1 PG — SIGNIFICANT CHANGE UP (ref 27–34)
MCHC RBC-ENTMCNC: 32.1 G/DL — SIGNIFICANT CHANGE UP (ref 32–36)
MCHC RBC-ENTMCNC: 32.1 G/DL — SIGNIFICANT CHANGE UP (ref 32–36)
MCHC RBC-ENTMCNC: 33.5 G/DL — SIGNIFICANT CHANGE UP (ref 32–36)
MCV RBC AUTO: 93.1 FL — SIGNIFICANT CHANGE UP (ref 80–100)
MCV RBC AUTO: 93.5 FL — SIGNIFICANT CHANGE UP (ref 80–100)
MCV RBC AUTO: 94.7 FL — SIGNIFICANT CHANGE UP (ref 80–100)
MONOCYTES # BLD AUTO: 1.16 K/UL — HIGH (ref 0–0.9)
MONOCYTES # BLD AUTO: 1.37 K/UL — HIGH (ref 0–0.9)
MONOCYTES NFR BLD AUTO: 8.9 % — SIGNIFICANT CHANGE UP (ref 2–14)
MONOCYTES NFR BLD AUTO: 9.9 % — SIGNIFICANT CHANGE UP (ref 2–14)
NEUTROPHILS # BLD AUTO: 8.42 K/UL — HIGH (ref 1.8–7.4)
NEUTROPHILS # BLD AUTO: 8.63 K/UL — HIGH (ref 1.8–7.4)
NEUTROPHILS NFR BLD AUTO: 62.4 % — SIGNIFICANT CHANGE UP (ref 43–77)
NEUTROPHILS NFR BLD AUTO: 64.4 % — SIGNIFICANT CHANGE UP (ref 43–77)
NRBC # BLD AUTO: 0.03 K/UL — HIGH (ref 0–0)
NRBC # BLD AUTO: 0.04 K/UL — HIGH (ref 0–0)
NRBC # BLD AUTO: 0.04 K/UL — HIGH (ref 0–0)
NRBC # FLD: 0.03 K/UL — HIGH (ref 0–0)
NRBC # FLD: 0.04 K/UL — HIGH (ref 0–0)
NRBC # FLD: 0.04 K/UL — HIGH (ref 0–0)
NRBC BLD AUTO-RTO: 0 /100 WBCS — SIGNIFICANT CHANGE UP (ref 0–0)
PLATELET # BLD AUTO: 152 K/UL — SIGNIFICANT CHANGE UP (ref 150–400)
PLATELET # BLD AUTO: 154 K/UL — SIGNIFICANT CHANGE UP (ref 150–400)
PLATELET # BLD AUTO: 180 K/UL — SIGNIFICANT CHANGE UP (ref 150–400)
POTASSIUM SERPL-MCNC: 4 MMOL/L — SIGNIFICANT CHANGE UP (ref 3.5–5.3)
POTASSIUM SERPL-SCNC: 4 MMOL/L — SIGNIFICANT CHANGE UP (ref 3.5–5.3)
PROT SERPL-MCNC: 6.6 G/DL — SIGNIFICANT CHANGE UP (ref 6–8.3)
RBC # BLD: 2.63 M/UL — LOW (ref 3.8–5.2)
RBC # BLD: 2.63 M/UL — LOW (ref 3.8–5.2)
RBC # BLD: 2.89 M/UL — LOW (ref 3.8–5.2)
RBC # FLD: 16.7 % — HIGH (ref 10.3–14.5)
RBC # FLD: 16.7 % — HIGH (ref 10.3–14.5)
RBC # FLD: 17 % — HIGH (ref 10.3–14.5)
SODIUM SERPL-SCNC: 138 MMOL/L — SIGNIFICANT CHANGE UP (ref 135–145)
URATE SERPL-MCNC: 4.9 MG/DL — SIGNIFICANT CHANGE UP (ref 2.5–7)
WBC # BLD: 13.06 K/UL — HIGH (ref 3.8–10.5)
WBC # BLD: 13.35 K/UL — HIGH (ref 3.8–10.5)
WBC # BLD: 13.83 K/UL — HIGH (ref 3.8–10.5)
WBC # FLD AUTO: 13.06 K/UL — HIGH (ref 3.8–10.5)
WBC # FLD AUTO: 13.35 K/UL — HIGH (ref 3.8–10.5)
WBC # FLD AUTO: 13.83 K/UL — HIGH (ref 3.8–10.5)

## 2025-05-12 PROCEDURE — 93010 ELECTROCARDIOGRAM REPORT: CPT

## 2025-05-12 RX ORDER — SODIUM CHLORIDE 9 G/1000ML
500 INJECTION, SOLUTION INTRAVENOUS ONCE
Refills: 0 | Status: COMPLETED | OUTPATIENT
Start: 2025-05-12 | End: 2025-05-12

## 2025-05-12 RX ORDER — METOCLOPRAMIDE HCL 10 MG
10 TABLET ORAL ONCE
Refills: 0 | Status: DISCONTINUED | OUTPATIENT
Start: 2025-05-12 | End: 2025-05-12

## 2025-05-12 RX ORDER — DIPHENHYDRAMINE HCL 12.5MG/5ML
25 ELIXIR ORAL ONCE
Refills: 0 | Status: DISCONTINUED | OUTPATIENT
Start: 2025-05-12 | End: 2025-05-12

## 2025-05-12 RX ADMIN — Medication 600 MILLIGRAM(S): at 18:55

## 2025-05-12 RX ADMIN — Medication 325 MILLIGRAM(S): at 11:27

## 2025-05-12 RX ADMIN — Medication 975 MILLIGRAM(S): at 20:30

## 2025-05-12 RX ADMIN — Medication 975 MILLIGRAM(S): at 14:50

## 2025-05-12 RX ADMIN — Medication 975 MILLIGRAM(S): at 15:50

## 2025-05-12 RX ADMIN — Medication 3 MILLILITER(S): at 06:50

## 2025-05-12 RX ADMIN — Medication 600 MILLIGRAM(S): at 01:06

## 2025-05-12 RX ADMIN — Medication 600 MILLIGRAM(S): at 17:55

## 2025-05-12 RX ADMIN — Medication 1 TABLET(S): at 11:27

## 2025-05-12 RX ADMIN — Medication 600 MILLIGRAM(S): at 00:36

## 2025-05-12 RX ADMIN — Medication 3 MILLILITER(S): at 14:50

## 2025-05-12 RX ADMIN — Medication 500 MILLIGRAM(S): at 11:27

## 2025-05-12 RX ADMIN — SODIUM CHLORIDE 1000 MILLILITER(S): 9 INJECTION, SOLUTION INTRAVENOUS at 11:56

## 2025-05-12 NOTE — PROGRESS NOTE ADULT - ATTENDING COMMENTS
patient seen and examined at bedside. agree with above assessment and plan
Pt seen and examined at bedside. Agree with the documentation as written, with changes made as necessary.    29y  PPD#1 s/p uncomplicated . Pregnancy/PP c/b gHTN.     Recovering well. Pain controlled. Lochia appropriate per pt but has not been checking pad recently. Breastfeeding/formula with latching issues. Tolerating po, voiding, passing flatus, ambulating w/o issue. No lightheadedness. Denies fevers, headaches, vision changes, RUQ pain. Baby doing well.    Vital Signs Last 24 Hrs  T(C): 36.7 (11 May 2025 10:03), Max: 36.9 (11 May 2025 06:57)  T(F): 98.1 (11 May 2025 10:03), Max: 98.4 (11 May 2025 06:57)  HR: 107 (11 May 2025 10:03) (73 - 127)  BP: 124/81 (11 May 2025 10:03) (105/53 - 156/85)  BP(mean): --  RR: 18 (11 May 2025 10:03) (15 - 20)  SpO2: 100% (11 May 2025 10:03) (82% - 100%)    Parameters below as of 10 May 2025 10:43  Patient On (Oxygen Delivery Method): room air    Gen: NAD  CV: regular rate - mild tachycardia  Lung: unlabored   Abd: post-gravid, soft, nontender, nondistended, fundus UU. Lochia with moderately saturated pad after 1 hour, no gushes on fundal check  Ext: no calf tenderness, trace edema  Neuro: alert                           9.6    11.78 )-----------( 155      ( 11 May 2025 07:11 )             29.2       A/P:  gHTN   -recent BPs wnl  -plan for AM CMP, uric acid, LDH  -CBC with low nl plt, likely from blood loss    PP care  -meeting pp milestones  -monitor tachycardia, advise hydration, repeat labs if needed   -monitor lochia closely - will add pad counts, if continuing to have heavy bleeding will plan for bimanual exam and BSUS  -lactation available  -hgb 11.5 > 9.6, acute blood loss anemia, asx  -contraception declines  -continue routine pp care  -plan for circumcision tomorrow or day of discharge  -anticipate DC PPD2    G Kyle LOPEZ

## 2025-05-12 NOTE — PROGRESS NOTE ADULT - ASSESSMENT
A/P: 28yo PPD#2 s/p  c/b gHTN. . Patient is stable and doing well post-partum. VSS, AF.    #gHTN  - BP's overnight: 120-130s/60-80s  - See separate documentation for gHTN counseling  - JARETT wnl  - Patient reports she has blood pressure cuff at home  - Advised patient to return to OB clinic this week for BP check    #Routine postpartum care  - Pain well controlled, continue current pain regimen  - Increase ambulation, SCDs when not ambulating  - Continue regular diet  - Hct: 35.5->29.2  - Continue to monitor vaginal lochia  - Postpartum contraception: declines    Elva Aguilar PGY1

## 2025-05-12 NOTE — PROGRESS NOTE ADULT - SUBJECTIVE AND OBJECTIVE BOX
OB Progress Note:  PPD#1    S: 30yo  PPD#1 s/p . Patient feels well. Pain is well controlled. She is tolerating a regular diet and passing flatus. She is voiding spontaneously, and ambulating without difficulty. Patient denies HA, CP, SOB, changes or spots in vision, new swelling in the hands and face, or RUQ/epigastric pain. Denies lightheadedness/dizziness. Denies N/V.    O:  Vitals:  Vital Signs Last 24 Hrs  T(C): 36.9 (11 May 2025 06:57), Max: 36.9 (10 May 2025 08:30)  T(F): 98.4 (11 May 2025 06:57), Max: 98.4 (10 May 2025 08:30)  HR: 111 (11 May 2025 06:57) (73 - 127)  BP: 121/74 (11 May 2025 06:57) (105/53 - 157/91)  BP(mean): --  RR: 18 (11 May 2025 06:57) (15 - 20)  SpO2: 100% (11 May 2025 06:57) (82% - 100%)    Parameters below as of 10 May 2025 10:43  Patient On (Oxygen Delivery Method): room air        MEDICATIONS  (STANDING):  acetaminophen     Tablet .. 975 milliGRAM(s) Oral <User Schedule>  ascorbic acid 500 milliGRAM(s) Oral daily  diphtheria/tetanus/pertussis (acellular) Vaccine (Adacel) 0.5 milliLiter(s) IntraMuscular once  ferrous    sulfate 325 milliGRAM(s) Oral daily  ibuprofen  Tablet. 600 milliGRAM(s) Oral every 6 hours  oxytocin Infusion 167 milliUNIT(s)/Min (167 mL/Hr) IV Continuous <Continuous>  oxytocin Infusion 167 milliUNIT(s)/Min (167 mL/Hr) IV Continuous <Continuous>  prenatal multivitamin 1 Tablet(s) Oral daily  senna 2 Tablet(s) Oral at bedtime  sodium chloride 0.9% lock flush 3 milliLiter(s) IV Push every 8 hours      Labs:  Blood type: B Positive  Rubella IgG: RPR: Negative                          9.6[L]   11.78[H] >-----------< 155    (  @ 07:11 )             29.2[L]                        11.5   7.99 >-----------< 189    ( 05-10 @ 09:10 )             35.5    05-10-25 @ 09:10      134[L]  |  100  |  14  ----------------------------<  76  4.1   |  16[L]  |  0.71        Ca    10.1      10 May 2025 09:10    TPro  7.3  /  Alb  3.5  /  TBili  0.7  /  DBili  x   /  AST  31  /  ALT  15  /  AlkPhos  212[H]  05-10-25 @ 09:10          Physical Exam:  General: NAD  Abdomen: soft, non-tender, non-distended, fundus firm  Vaginal: pad saturated 80% over 3 hours, initial small gush of blood with fundal pressure, no further bleeding noted with additional fundal pressure  Extremities: No erythema/edema
OB Progress Note:  PPD#2    S: 28yo  PPD#2 s/p . Patient feels well. Pain is well controlled. She is tolerating a regular diet and passing flatus. She is voiding spontaneously, and ambulating without difficulty. Patient denies HA, CP, SOB, changes or spots in vision, new swelling in the hands and face, or RUQ/epigastric pain. Denies lightheadedness/dizziness. Denies N/V.    O:   Vital Signs Last 24 Hrs  T(C): 36.7 (12 May 2025 01:55), Max: 37.1 (11 May 2025 17:20)  T(F): 98.1 (12 May 2025 01:), Max: 98.8 (11 May 2025 17:20)  HR: 93 (12 May 2025 01:55) (92 - 111)  BP: 123/68 (12 May 2025 01:55) (118/69 - 136/78)  BP(mean): --  RR: 18 (12 May 2025 01:) (18 - 19)  SpO2: 100% (12 May 2025 01:55) (100% - 100%)    Parameters below as of 12 May 2025 01:55  Patient On (Oxygen Delivery Method): room air        Labs:  Blood type: B Positive  Rubella IgG: RPR: Negative                          9.6[L]   11.78[H] >-----------< 155    (  @ 07:11 )             29.2[L]                        11.5   7.99 >-----------< 189    ( 05-10 @ 09:10 )             35.5    25 @ 09:55      133[L]  |  100  |  18  ----------------------------<  123[H]  3.9   |  19[L]  |  0.99    05-10-25 @ 09:10      134[L]  |  100  |  14  ----------------------------<  76  4.1   |  16[L]  |  0.71        Ca    9.5      11 May 2025 09:55  Ca    10.1      10 May 2025 09:10    TPro  5.9[L]  /  Alb  2.9[L]  /  TBili  0.5  /  DBili  x   /  AST  27  /  ALT  16  /  AlkPhos  164[H]  25 @ 09:55  TPro  7.3  /  Alb  3.5  /  TBili  0.7  /  DBili  x   /  AST  31  /  ALT  15  /  AlkPhos  212[H]  05-10-25 @ 09:10          PE:  General: NAD  Abdomen: Mildly distended, appropriately tender, fundus firm   Extremities: No erythema, no pitting edema    
POD#1 s/p Vaginal Delivery. Patient is doing well, OOBAA. Toleraing clears. Pain is tolerable. No residual anesthetic issues or complications noted.     Ashley Reyes, DNP, CRNA

## 2025-06-03 NOTE — OB RN PATIENT PROFILE - FALL HARM RISK - TYPE OF ASSESSMENT
Detail Level: Detailed Patient Specific Counseling (Will Not Stick From Patient To Patient): Patient does not wish to discuss any systemic medications given risks associated.  She discussed them with Vicki previously as well as with Dr. Larsen at consultation 2 years ago.  He agreed with continued ILK given her concerns. Daily Assessment